# Patient Record
Sex: FEMALE | Race: WHITE | NOT HISPANIC OR LATINO | Employment: FULL TIME | URBAN - METROPOLITAN AREA
[De-identification: names, ages, dates, MRNs, and addresses within clinical notes are randomized per-mention and may not be internally consistent; named-entity substitution may affect disease eponyms.]

---

## 2023-05-23 ENCOUNTER — OFFICE VISIT (OUTPATIENT)
Dept: PRIMARY CARE | Facility: CLINIC | Age: 38
End: 2023-05-23
Payer: COMMERCIAL

## 2023-05-23 ENCOUNTER — LAB (OUTPATIENT)
Dept: LAB | Facility: LAB | Age: 38
End: 2023-05-23
Payer: COMMERCIAL

## 2023-05-23 VITALS
WEIGHT: 187 LBS | DIASTOLIC BLOOD PRESSURE: 61 MMHG | BODY MASS INDEX: 29.73 KG/M2 | RESPIRATION RATE: 16 BRPM | SYSTOLIC BLOOD PRESSURE: 103 MMHG | HEART RATE: 80 BPM | OXYGEN SATURATION: 97 %

## 2023-05-23 DIAGNOSIS — R19.7 ACUTE DIARRHEA: Primary | ICD-10-CM

## 2023-05-23 DIAGNOSIS — R19.7 ACUTE DIARRHEA: ICD-10-CM

## 2023-05-23 PROBLEM — F43.21 ADJUSTMENT DISORDER WITH DEPRESSED MOOD: Status: ACTIVE | Noted: 2023-05-23

## 2023-05-23 PROBLEM — D72.819 WBC DECREASED: Status: ACTIVE | Noted: 2023-05-23

## 2023-05-23 PROBLEM — N76.2 VULVITIS: Status: ACTIVE | Noted: 2023-05-23

## 2023-05-23 PROBLEM — L08.9: Status: RESOLVED | Noted: 2023-05-23 | Resolved: 2023-05-23

## 2023-05-23 PROBLEM — S00.81XS: Status: RESOLVED | Noted: 2023-05-23 | Resolved: 2023-05-23

## 2023-05-23 PROCEDURE — 87506 IADNA-DNA/RNA PROBE TQ 6-11: CPT

## 2023-05-23 PROCEDURE — 99213 OFFICE O/P EST LOW 20 MIN: CPT

## 2023-05-23 PROCEDURE — 87493 C DIFF AMPLIFIED PROBE: CPT

## 2023-05-23 ASSESSMENT — ENCOUNTER SYMPTOMS
DIZZINESS: 0
VOICE CHANGE: 0
RESPIRATORY NEGATIVE: 1
POLYDIPSIA: 0
ANAL BLEEDING: 0
CARDIOVASCULAR NEGATIVE: 1
RHINORRHEA: 0
NECK PAIN: 0
FATIGUE: 0
NERVOUS/ANXIOUS: 0
TREMORS: 0
MYALGIAS: 0
EYE DISCHARGE: 0
DIARRHEA: 1
NECK STIFFNESS: 0
UNEXPECTED WEIGHT CHANGE: 0
VOMITING: 0
ACTIVITY CHANGE: 0
PSYCHIATRIC NEGATIVE: 1
WEAKNESS: 0
DYSPHORIC MOOD: 0
SHORTNESS OF BREATH: 0
BRUISES/BLEEDS EASILY: 0
CONSTITUTIONAL NEGATIVE: 1
PALPITATIONS: 0
ABDOMINAL PAIN: 0
NUMBNESS: 0
TROUBLE SWALLOWING: 0
ENDOCRINE NEGATIVE: 1
HEMATURIA: 0
BACK PAIN: 0
SORE THROAT: 0
CONFUSION: 0
STRIDOR: 0
COLOR CHANGE: 0
MUSCULOSKELETAL NEGATIVE: 1
APPETITE CHANGE: 0
ALLERGIC/IMMUNOLOGIC NEGATIVE: 1
CONSTIPATION: 0
RECTAL PAIN: 0
PHOTOPHOBIA: 0
FLANK PAIN: 0
SINUS PRESSURE: 0
ABDOMINAL DISTENTION: 0
SEIZURES: 0
DIFFICULTY URINATING: 0
APNEA: 0
HEMATOLOGIC/LYMPHATIC NEGATIVE: 1
DYSURIA: 0
NAUSEA: 0
BLOOD IN STOOL: 0
HYPERACTIVE: 0
JOINT SWELLING: 0
COUGH: 0
WHEEZING: 0
HEADACHES: 1
POLYPHAGIA: 0
AGITATION: 0
EYES NEGATIVE: 1
LIGHT-HEADEDNESS: 0
CHEST TIGHTNESS: 0
SPEECH DIFFICULTY: 0
FREQUENCY: 0
SLEEP DISTURBANCE: 0

## 2023-05-23 NOTE — PROGRESS NOTES
Subjective   Elham Scott is a 38 y.o. female who presents for No chief complaint on file..    Started 4 days ago, eat out before she started getting diarrhea (meal- wild mushroom in a cream sauce; glass of wine; chocolate bomb)  Initially every 15mins she started having diarrhea, now having a few small BM a day  Tried eating rice and toast  Taking probiotic  Mild HA         Review of Systems   Constitutional: Negative.  Negative for activity change, appetite change, fatigue and unexpected weight change.   HENT: Negative.  Negative for congestion, dental problem, ear discharge, ear pain, hearing loss, mouth sores, nosebleeds, postnasal drip, rhinorrhea, sinus pressure, sneezing, sore throat, tinnitus, trouble swallowing and voice change.    Eyes: Negative.  Negative for photophobia, discharge and visual disturbance.   Respiratory: Negative.  Negative for apnea, cough, chest tightness, shortness of breath, wheezing and stridor.    Cardiovascular: Negative.  Negative for chest pain, palpitations and leg swelling.   Gastrointestinal:  Positive for diarrhea. Negative for abdominal distention, abdominal pain, anal bleeding, blood in stool, constipation, nausea, rectal pain and vomiting.   Endocrine: Negative.  Negative for cold intolerance, heat intolerance, polydipsia, polyphagia and polyuria.   Genitourinary: Negative.  Negative for decreased urine volume, difficulty urinating, dysuria, flank pain, frequency, hematuria and urgency.   Musculoskeletal: Negative.  Negative for back pain, gait problem, joint swelling, myalgias, neck pain and neck stiffness.   Skin: Negative.  Negative for color change and rash.   Allergic/Immunologic: Negative.  Negative for food allergies.   Neurological:  Positive for headaches (mild). Negative for dizziness, tremors, seizures, syncope, speech difficulty, weakness, light-headedness and numbness.   Hematological: Negative.  Does not bruise/bleed easily.   Psychiatric/Behavioral:  Negative.  Negative for agitation, confusion, dysphoric mood and sleep disturbance. The patient is not nervous/anxious and is not hyperactive.    All other systems reviewed and are negative.      Objective   There were no vitals taken for this visit.    Physical Exam  Vitals reviewed.   Constitutional:       General: She is not in acute distress.     Appearance: Normal appearance. She is normal weight. She is not ill-appearing, toxic-appearing or diaphoretic.   HENT:      Head: Normocephalic and atraumatic.      Nose: Nose normal.   Eyes:      Extraocular Movements: Extraocular movements intact.      Conjunctiva/sclera: Conjunctivae normal.      Pupils: Pupils are equal, round, and reactive to light.   Cardiovascular:      Rate and Rhythm: Normal rate and regular rhythm.      Pulses: Normal pulses.      Heart sounds: Normal heart sounds. No murmur heard.     No friction rub. No gallop.   Pulmonary:      Effort: Pulmonary effort is normal. No respiratory distress.      Breath sounds: Normal breath sounds.   Abdominal:      General: Abdomen is flat. Bowel sounds are normal. There is no distension.      Palpations: Abdomen is soft. There is no mass.      Tenderness: There is no abdominal tenderness. There is no right CVA tenderness, left CVA tenderness, guarding or rebound.      Hernia: No hernia is present.   Musculoskeletal:         General: Normal range of motion.      Cervical back: Normal range of motion and neck supple.   Skin:     General: Skin is warm and dry.      Capillary Refill: Capillary refill takes less than 2 seconds.   Neurological:      General: No focal deficit present.      Mental Status: She is alert and oriented to person, place, and time. Mental status is at baseline.   Psychiatric:         Mood and Affect: Mood normal.         Behavior: Behavior normal.         Thought Content: Thought content normal.         Judgment: Judgment normal.       Assessment/Plan   Problem List Items Addressed This  Visit          Digestive    Acute diarrhea - Primary    Relevant Orders    C. difficile, PCR    Stool Pathogen Panel, PCR  [place] diet discussed  Pepto Bismol OTC; she will call Friday if needs rx for diarrhea      Continue with RTC & follow up as needed

## 2023-05-24 LAB — C. DIFFICILE TOXIN, PCR: NOT DETECTED

## 2023-05-25 LAB
CAMPYLOBACTER GP: NOT DETECTED
NOROVIRUS GI/GII: NOT DETECTED
ROTAVIRUS A: DETECTED
SALMONELLA SP.: NOT DETECTED
SHIGA TOXIN 1: NOT DETECTED
SHIGA TOXIN 2: NOT DETECTED
SHIGELLA SP.: NOT DETECTED
VIBRIO GRP.: NOT DETECTED
YERSINIA ENTEROCOLITICA: NOT DETECTED

## 2023-05-26 NOTE — RESULT ENCOUNTER NOTE
Called and discussed positive rotavirus results with Elham.  She is doing much better, took Pepto-Bismol over-the-counter and started having formed stool again.  Discussed good hand hygiene and being contagious for up to 10 days from symptom starting.

## 2023-08-28 ENCOUNTER — OFFICE VISIT (OUTPATIENT)
Dept: PRIMARY CARE | Facility: CLINIC | Age: 38
End: 2023-08-28
Payer: COMMERCIAL

## 2023-08-28 VITALS
HEART RATE: 80 BPM | WEIGHT: 186.4 LBS | BODY MASS INDEX: 29.26 KG/M2 | OXYGEN SATURATION: 98 % | DIASTOLIC BLOOD PRESSURE: 62 MMHG | SYSTOLIC BLOOD PRESSURE: 100 MMHG | RESPIRATION RATE: 20 BRPM | HEIGHT: 67 IN

## 2023-08-28 DIAGNOSIS — M53.3 SI (SACROILIAC) JOINT DYSFUNCTION: Primary | ICD-10-CM

## 2023-08-28 DIAGNOSIS — K59.00 CONSTIPATION, UNSPECIFIED CONSTIPATION TYPE: ICD-10-CM

## 2023-08-28 DIAGNOSIS — M54.31 BILATERAL SCIATICA: ICD-10-CM

## 2023-08-28 DIAGNOSIS — M54.32 BILATERAL SCIATICA: ICD-10-CM

## 2023-08-28 PROBLEM — D62 ACUTE BLOOD LOSS ANEMIA: Status: ACTIVE | Noted: 2019-04-05

## 2023-08-28 PROCEDURE — 99214 OFFICE O/P EST MOD 30 MIN: CPT

## 2023-08-28 PROCEDURE — 1036F TOBACCO NON-USER: CPT

## 2023-08-28 RX ORDER — ASCORBIC ACID 500 MG
500 TABLET ORAL 2 TIMES DAILY
COMMUNITY
Start: 2019-04-05 | End: 2024-02-07 | Stop reason: WASHOUT

## 2023-08-28 RX ORDER — DOCUSATE SODIUM 50 MG/5ML
LIQUID ORAL
COMMUNITY

## 2023-08-28 RX ORDER — FERROUS SULFATE 325(65) MG
1 TABLET ORAL
COMMUNITY
Start: 2019-04-05 | End: 2024-02-07 | Stop reason: WASHOUT

## 2023-08-28 RX ORDER — IBUPROFEN 600 MG/1
600 TABLET ORAL EVERY 6 HOURS PRN
COMMUNITY
Start: 2019-04-05

## 2023-08-28 RX ORDER — DOCUSATE SODIUM 100 MG/1
2 CAPSULE, LIQUID FILLED ORAL NIGHTLY
COMMUNITY
Start: 2019-04-05 | End: 2023-12-05

## 2023-08-28 RX ORDER — AMOXICILLIN 500 MG/1
500 CAPSULE ORAL DAILY
COMMUNITY
Start: 2023-01-23 | End: 2023-12-05

## 2023-08-28 RX ORDER — CHOLECALCIFEROL (VITAMIN D3) 125 MCG
CAPSULE ORAL
COMMUNITY
End: 2024-02-07 | Stop reason: WASHOUT

## 2023-08-28 ASSESSMENT — ENCOUNTER SYMPTOMS
ACTIVITY CHANGE: 0
BLOOD IN STOOL: 0
SINUS PRESSURE: 0
ABDOMINAL PAIN: 0
UNEXPECTED WEIGHT CHANGE: 0
PSYCHIATRIC NEGATIVE: 1
POLYDIPSIA: 0
COUGH: 0
CONFUSION: 0
ENDOCRINE NEGATIVE: 1
STRIDOR: 0
HEADACHES: 0
APPETITE CHANGE: 0
VOMITING: 0
SLEEP DISTURBANCE: 0
POLYPHAGIA: 0
DIFFICULTY URINATING: 0
HYPERACTIVE: 0
TREMORS: 0
OCCASIONAL FEELINGS OF UNSTEADINESS: 0
HEMATURIA: 0
FATIGUE: 0
SHORTNESS OF BREATH: 0
NEUROLOGICAL NEGATIVE: 1
PHOTOPHOBIA: 0
DYSURIA: 0
DYSPHORIC MOOD: 0
WEAKNESS: 0
SEIZURES: 0
FEVER: 0
RESPIRATORY NEGATIVE: 1
BACK PAIN: 1
ARTHRALGIAS: 1
PALPITATIONS: 0
DIAPHORESIS: 0
LOSS OF SENSATION IN FEET: 0
TROUBLE SWALLOWING: 0
AGITATION: 0
WHEEZING: 0
CARDIOVASCULAR NEGATIVE: 1
VOICE CHANGE: 0
APNEA: 0
DEPRESSION: 0
FLANK PAIN: 0
BRUISES/BLEEDS EASILY: 0
NAUSEA: 0
CHILLS: 0
CONSTITUTIONAL NEGATIVE: 1
SPEECH DIFFICULTY: 0
DIARRHEA: 0
MYALGIAS: 1
EYES NEGATIVE: 1
RECTAL PAIN: 0
DIZZINESS: 0
NECK PAIN: 0
RHINORRHEA: 0
CHEST TIGHTNESS: 0
NERVOUS/ANXIOUS: 0
SORE THROAT: 0
FREQUENCY: 0
NUMBNESS: 0
HEMATOLOGIC/LYMPHATIC NEGATIVE: 1
NECK STIFFNESS: 0
COLOR CHANGE: 0
EYE DISCHARGE: 0
JOINT SWELLING: 0
LIGHT-HEADEDNESS: 0
ANAL BLEEDING: 0

## 2023-08-28 NOTE — PROGRESS NOTES
Primary Care Provider: Tha Cardoza MD    Subjective   Elham Scott is a 38 y.o. female who presents for Follow-up (Issues with hips).    BL SI joint pain with radiation down both legs; right worse than left  Comes and goes  Pain radiates down into buttock and quadriceps  Tight hamstrings  Pain worse around menstrual cycle  Got a new mattress   Has been going since June; has gotten slightly better but still ongoning  Worse at night  Has had regular menstrual cycles, LMP- today  Bloating  Hx of SI and piriformis   Taking Tylenol- only sometimes helps   Denies any numbness, tingling, or weakness         Review of Systems   Constitutional: Negative.  Negative for activity change, appetite change, chills, diaphoresis, fatigue, fever and unexpected weight change.   HENT: Negative.  Negative for congestion, dental problem, ear discharge, ear pain, hearing loss, mouth sores, nosebleeds, postnasal drip, rhinorrhea, sinus pressure, sneezing, sore throat, tinnitus, trouble swallowing and voice change.    Eyes: Negative.  Negative for photophobia, discharge and visual disturbance.   Respiratory: Negative.  Negative for apnea, cough, chest tightness, shortness of breath, wheezing and stridor.    Cardiovascular: Negative.  Negative for chest pain, palpitations and leg swelling.   Gastrointestinal:  Negative for abdominal pain, anal bleeding, blood in stool, diarrhea, nausea, rectal pain and vomiting.   Endocrine: Negative.  Negative for cold intolerance, heat intolerance, polydipsia, polyphagia and polyuria.   Genitourinary: Negative.  Negative for decreased urine volume, difficulty urinating, dysuria, flank pain, frequency, hematuria and urgency.   Musculoskeletal:  Positive for arthralgias, back pain and myalgias. Negative for gait problem, joint swelling, neck pain and neck stiffness.   Skin: Negative.  Negative for color change and rash.   Neurological: Negative.  Negative for dizziness, tremors, seizures, syncope,  "speech difficulty, weakness, light-headedness, numbness and headaches.   Hematological: Negative.  Does not bruise/bleed easily.   Psychiatric/Behavioral: Negative.  Negative for agitation, confusion, dysphoric mood, sleep disturbance and suicidal ideas. The patient is not nervous/anxious and is not hyperactive.    All other systems reviewed and are negative.        Objective   /62 Comment: blood pressure machine wouldnt read typically low  Pulse 80   Resp 20   Ht 1.689 m (5' 6.5\")   Wt 84.6 kg (186 lb 6.4 oz)   SpO2 98%   BMI 29.64 kg/m²     Physical Exam  Vitals and nursing note reviewed.   Constitutional:       Appearance: Normal appearance. She is normal weight.   HENT:      Head: Normocephalic and atraumatic.   Eyes:      Conjunctiva/sclera: Conjunctivae normal.   Cardiovascular:      Rate and Rhythm: Normal rate and regular rhythm.      Pulses: Normal pulses.      Heart sounds: Normal heart sounds.   Pulmonary:      Effort: Pulmonary effort is normal.      Breath sounds: Normal breath sounds.   Abdominal:      General: Abdomen is flat. Bowel sounds are normal. There is no distension.      Palpations: Abdomen is soft. There is no mass.      Tenderness: There is no abdominal tenderness. There is no right CVA tenderness, left CVA tenderness, guarding or rebound.      Hernia: No hernia is present.   Musculoskeletal:         General: No swelling, tenderness, deformity or signs of injury. Normal range of motion.      Cervical back: Normal range of motion and neck supple.      Right lower leg: No edema.      Left lower leg: No edema.   Skin:     General: Skin is warm and dry.      Capillary Refill: Capillary refill takes less than 2 seconds.   Neurological:      General: No focal deficit present.      Mental Status: She is alert and oriented to person, place, and time. Mental status is at baseline.   Psychiatric:         Mood and Affect: Mood normal.         Behavior: Behavior normal.         Thought " Content: Thought content normal.         Judgment: Judgment normal.         Assessment/Plan   Problem List Items Addressed This Visit       SI (sacroiliac) joint dysfunction - Primary    Relevant Orders    Referral to [facility]    Referral to [facility]  ICE  Sleep with pillow between legs   Ibuprofen PRN    Bilateral sciatica    Relevant Orders    Referral to [facility]    Referral to [facility]     Other Visit Diagnoses       Constipation, unspecified constipation type       Miralax PRN; if fails trial Benefiber         Follow up in 3-4 months or sooner as needed

## 2023-09-14 PROBLEM — Z13.71 TESTING OF FEMALE FOR GENETIC DISEASE CARRIER STATUS: Status: ACTIVE | Noted: 2023-09-14

## 2023-09-14 PROBLEM — E66.3 OVERWEIGHT WITH BODY MASS INDEX (BMI) OF 29 TO 29.9 IN ADULT: Status: ACTIVE | Noted: 2023-09-14

## 2023-10-12 NOTE — PROGRESS NOTES
"Assessment/Plan   Diagnoses and all orders for this visit:  Well woman exam (no gynecological exam)      Sakina Little, APRN-CNM     Subjective   Elham Scott is a 38 y.o. female who is here for a routine exam. Periods are {gyn period regularity:715}, lasting {numbers; 0-10:16671} days. Dysmenorrhea:{gyn dysmenorrhea:716}. Cyclic symptoms include {sys gyn cyclic sx:47905}. No intermenstrual bleeding, spotting, or discharge.    Current contraception: {contraceptive method:5051}  [unfilled]   History of abnormal Pap smear: yes - 2017 ascus/hpv - 2020 wnl/hpv-  Family history of uterine or ovarian cancer: {yes***/no:51200::\"no\"}  [unfilled]   Family history of breast cancer: {yes***/no:87357::\"no\"}  Regular self breast exam: {yes/no:63}  History of abnormal mammogram: {yes***/no:74899::\"no\"}    Menstrual History:  OB History    No obstetric history on file.        Menarche age: ***  No LMP recorded.         Review of Systems    Objective   There were no vitals taken for this visit.    General:   Alert and oriented x 3   Heart:  Thyroid: Regular rate, rhythm  Euthyroid, normal shape and size   Lungs:  Breast: Clear to auscultation bilaterally  Symmetrical, no skin changes/nipple discharge, redness, tenderness, no masses palpated bilaterally   Abdomen: Soft, non tender   Vulva: EGBUS normal   Vagina: Pink, normal discharge   Cervix: No CMT   Uterus: Normal shape, size   Adnexa: NT bilaterally     "

## 2023-10-17 ENCOUNTER — APPOINTMENT (OUTPATIENT)
Dept: OBSTETRICS AND GYNECOLOGY | Facility: CLINIC | Age: 38
End: 2023-10-17
Payer: COMMERCIAL

## 2023-11-08 NOTE — PROGRESS NOTES
Assessment/Plan   Diagnoses and all orders for this visit:  Well woman exam with routine gynecological exam      Sakina Little, APRN-CNM     Subjective   Elham Scott is a 38 y.o. female who is here for a routine exam. Periods are regular every 28-30 days, lasting 3 days. Dysmenorrhea:mild, occurring first 1-2 days of flow. Cyclic symptoms include none. No intermenstrual bleeding, spotting, or discharge.    Current contraception: vasectomy  [unfilled]   History of abnormal Pap smear: yes - 2017 Ascus  Family history of uterine or ovarian cancer: no  [unfilled]   Family history of breast cancer: yes - mgm   Regular self breast exam: yes  History of abnormal mammogram: no    Menstrual History:  OB History    No obstetric history on file.        Menarche age: 12  12/2/23      Review of Systems   All other systems reviewed and are negative.      Objective   There were no vitals taken for this visit.    General:   Alert and oriented x 3   Heart:  Thyroid: Regular rate, rhythm  Euthyroid, normal shape and size   Lungs:  Breast: Clear to auscultation bilaterally  Symmetrical, no skin changes/nipple discharge, redness, tenderness, no masses palpated bilaterally   Abdomen: Soft, non tender   Vulva: EGBUS normal   Vagina: Pink, normal discharge   Cervix: No CMT   Uterus: Normal shape, size   Adnexa: NT bilaterally

## 2023-12-05 ENCOUNTER — OFFICE VISIT (OUTPATIENT)
Dept: OBSTETRICS AND GYNECOLOGY | Facility: CLINIC | Age: 38
End: 2023-12-05
Payer: COMMERCIAL

## 2023-12-05 VITALS
DIASTOLIC BLOOD PRESSURE: 66 MMHG | BODY MASS INDEX: 30.45 KG/M2 | HEIGHT: 67 IN | WEIGHT: 194 LBS | SYSTOLIC BLOOD PRESSURE: 110 MMHG

## 2023-12-05 DIAGNOSIS — Z12.31 VISIT FOR SCREENING MAMMOGRAM: ICD-10-CM

## 2023-12-05 DIAGNOSIS — Z01.419 WELL WOMAN EXAM WITH ROUTINE GYNECOLOGICAL EXAM: Primary | ICD-10-CM

## 2023-12-05 PROCEDURE — 1036F TOBACCO NON-USER: CPT | Performed by: MIDWIFE

## 2023-12-05 PROCEDURE — 87624 HPV HI-RISK TYP POOLED RSLT: CPT

## 2023-12-05 PROCEDURE — 99396 PREV VISIT EST AGE 40-64: CPT | Performed by: MIDWIFE

## 2023-12-05 PROCEDURE — 88175 CYTOPATH C/V AUTO FLUID REDO: CPT

## 2023-12-05 ASSESSMENT — ENCOUNTER SYMPTOMS
CARDIOVASCULAR NEGATIVE: 0
HEMATOLOGIC/LYMPHATIC NEGATIVE: 0
NEUROLOGICAL NEGATIVE: 0
CONSTITUTIONAL NEGATIVE: 0
PSYCHIATRIC NEGATIVE: 0
ALLERGIC/IMMUNOLOGIC NEGATIVE: 0
RESPIRATORY NEGATIVE: 0
ENDOCRINE NEGATIVE: 0
EYES NEGATIVE: 0
MUSCULOSKELETAL NEGATIVE: 0
GASTROINTESTINAL NEGATIVE: 0

## 2023-12-05 ASSESSMENT — PAIN SCALES - GENERAL: PAINLEVEL: 0-NO PAIN

## 2023-12-20 ENCOUNTER — TELEPHONE (OUTPATIENT)
Dept: PRIMARY CARE | Facility: CLINIC | Age: 38
End: 2023-12-20
Payer: COMMERCIAL

## 2023-12-20 LAB
CYTOLOGY CMNT CVX/VAG CYTO-IMP: NORMAL
HPV HR 12 DNA GENITAL QL NAA+PROBE: NEGATIVE
HPV HR GENOTYPES PNL CVX NAA+PROBE: NEGATIVE
HPV16 DNA SPEC QL NAA+PROBE: NEGATIVE
HPV18 DNA SPEC QL NAA+PROBE: NEGATIVE
LAB AP HPV GENOTYPE QUESTION: YES
LAB AP HPV HR: NORMAL
LABORATORY COMMENT REPORT: NORMAL
PATH REPORT.TOTAL CANCER: NORMAL

## 2023-12-27 ENCOUNTER — LAB (OUTPATIENT)
Dept: LAB | Facility: LAB | Age: 38
End: 2023-12-27
Payer: COMMERCIAL

## 2023-12-27 DIAGNOSIS — Z00.00 ROUTINE GENERAL MEDICAL EXAMINATION AT A HEALTH CARE FACILITY: ICD-10-CM

## 2023-12-27 LAB
ALBUMIN SERPL BCP-MCNC: 3.9 G/DL (ref 3.4–5)
ALP SERPL-CCNC: 38 U/L (ref 33–110)
ALT SERPL W P-5'-P-CCNC: 10 U/L (ref 7–45)
ANION GAP SERPL CALC-SCNC: 11 MMOL/L (ref 10–20)
APPEARANCE UR: CLEAR
AST SERPL W P-5'-P-CCNC: 13 U/L (ref 9–39)
BACTERIA #/AREA URNS AUTO: ABNORMAL /HPF
BASOPHILS # BLD AUTO: 0.04 X10*3/UL (ref 0–0.1)
BASOPHILS NFR BLD AUTO: 1 %
BILIRUB SERPL-MCNC: 0.5 MG/DL (ref 0–1.2)
BILIRUB UR STRIP.AUTO-MCNC: NEGATIVE MG/DL
BUN SERPL-MCNC: 12 MG/DL (ref 6–23)
CALCIUM SERPL-MCNC: 9.1 MG/DL (ref 8.6–10.6)
CHLORIDE SERPL-SCNC: 107 MMOL/L (ref 98–107)
CHOLEST SERPL-MCNC: 176 MG/DL (ref 0–199)
CHOLESTEROL/HDL RATIO: 3.2
CO2 SERPL-SCNC: 28 MMOL/L (ref 21–32)
COLOR UR: YELLOW
CREAT SERPL-MCNC: 0.94 MG/DL (ref 0.5–1.05)
EOSINOPHIL # BLD AUTO: 0.08 X10*3/UL (ref 0–0.7)
EOSINOPHIL NFR BLD AUTO: 1.9 %
ERYTHROCYTE [DISTWIDTH] IN BLOOD BY AUTOMATED COUNT: 12.7 % (ref 11.5–14.5)
GFR SERPL CREATININE-BSD FRML MDRD: 79 ML/MIN/1.73M*2
GLUCOSE SERPL-MCNC: 91 MG/DL (ref 74–99)
GLUCOSE UR STRIP.AUTO-MCNC: NEGATIVE MG/DL
HCT VFR BLD AUTO: 37.8 % (ref 36–46)
HDLC SERPL-MCNC: 55.2 MG/DL
HGB BLD-MCNC: 12.4 G/DL (ref 12–16)
IMM GRANULOCYTES # BLD AUTO: 0.01 X10*3/UL (ref 0–0.7)
IMM GRANULOCYTES NFR BLD AUTO: 0.2 % (ref 0–0.9)
KETONES UR STRIP.AUTO-MCNC: NEGATIVE MG/DL
LDLC SERPL CALC-MCNC: 104 MG/DL
LEUKOCYTE ESTERASE UR QL STRIP.AUTO: ABNORMAL
LYMPHOCYTES # BLD AUTO: 1.3 X10*3/UL (ref 1.2–4.8)
LYMPHOCYTES NFR BLD AUTO: 31.1 %
MCH RBC QN AUTO: 30.4 PG (ref 26–34)
MCHC RBC AUTO-ENTMCNC: 32.8 G/DL (ref 32–36)
MCV RBC AUTO: 93 FL (ref 80–100)
MONOCYTES # BLD AUTO: 0.32 X10*3/UL (ref 0.1–1)
MONOCYTES NFR BLD AUTO: 7.7 %
NEUTROPHILS # BLD AUTO: 2.43 X10*3/UL (ref 1.2–7.7)
NEUTROPHILS NFR BLD AUTO: 58.1 %
NITRITE UR QL STRIP.AUTO: NEGATIVE
NON HDL CHOLESTEROL: 121 MG/DL (ref 0–149)
NRBC BLD-RTO: 0 /100 WBCS (ref 0–0)
PH UR STRIP.AUTO: 7 [PH]
PLATELET # BLD AUTO: 218 X10*3/UL (ref 150–450)
POTASSIUM SERPL-SCNC: 4.3 MMOL/L (ref 3.5–5.3)
PROT SERPL-MCNC: 6.2 G/DL (ref 6.4–8.2)
PROT UR STRIP.AUTO-MCNC: NEGATIVE MG/DL
RBC # BLD AUTO: 4.08 X10*6/UL (ref 4–5.2)
RBC # UR STRIP.AUTO: NEGATIVE /UL
RBC #/AREA URNS AUTO: ABNORMAL /HPF
SODIUM SERPL-SCNC: 142 MMOL/L (ref 136–145)
SP GR UR STRIP.AUTO: 1.01
SQUAMOUS #/AREA URNS AUTO: ABNORMAL /HPF
TRIGL SERPL-MCNC: 83 MG/DL (ref 0–149)
TSH SERPL-ACNC: 2.92 MIU/L (ref 0.44–3.98)
UROBILINOGEN UR STRIP.AUTO-MCNC: <2 MG/DL
VLDL: 17 MG/DL (ref 0–40)
WBC # BLD AUTO: 4.2 X10*3/UL (ref 4.4–11.3)
WBC #/AREA URNS AUTO: ABNORMAL /HPF

## 2023-12-27 PROCEDURE — 84443 ASSAY THYROID STIM HORMONE: CPT

## 2023-12-27 PROCEDURE — 80061 LIPID PANEL: CPT

## 2023-12-27 PROCEDURE — 85025 COMPLETE CBC W/AUTO DIFF WBC: CPT

## 2023-12-27 PROCEDURE — 36415 COLL VENOUS BLD VENIPUNCTURE: CPT

## 2023-12-27 PROCEDURE — 81001 URINALYSIS AUTO W/SCOPE: CPT

## 2023-12-27 PROCEDURE — 80053 COMPREHEN METABOLIC PANEL: CPT

## 2023-12-27 ASSESSMENT — PROMIS GLOBAL HEALTH SCALE
EMOTIONAL_PROBLEMS: SOMETIMES
RATE_SOCIAL_SATISFACTION: GOOD
CARRYOUT_PHYSICAL_ACTIVITIES: COMPLETELY
RATE_QUALITY_OF_LIFE: VERY GOOD
CARRYOUT_SOCIAL_ACTIVITIES: VERY GOOD
RATE_GENERAL_HEALTH: GOOD
RATE_AVERAGE_FATIGUE: MODERATE
RATE_MENTAL_HEALTH: GOOD
RATE_AVERAGE_PAIN: 2
RATE_PHYSICAL_HEALTH: GOOD

## 2023-12-28 ENCOUNTER — APPOINTMENT (OUTPATIENT)
Dept: PRIMARY CARE | Facility: CLINIC | Age: 38
End: 2023-12-28
Payer: COMMERCIAL

## 2023-12-29 ENCOUNTER — OFFICE VISIT (OUTPATIENT)
Dept: PRIMARY CARE | Facility: CLINIC | Age: 38
End: 2023-12-29
Payer: COMMERCIAL

## 2023-12-29 VITALS
HEART RATE: 81 BPM | RESPIRATION RATE: 20 BRPM | OXYGEN SATURATION: 99 % | DIASTOLIC BLOOD PRESSURE: 78 MMHG | HEIGHT: 67 IN | BODY MASS INDEX: 30.13 KG/M2 | SYSTOLIC BLOOD PRESSURE: 120 MMHG | WEIGHT: 192 LBS

## 2023-12-29 DIAGNOSIS — Z23 ENCOUNTER FOR IMMUNIZATION: ICD-10-CM

## 2023-12-29 DIAGNOSIS — Z00.00 ROUTINE GENERAL MEDICAL EXAMINATION AT A HEALTH CARE FACILITY: Primary | ICD-10-CM

## 2023-12-29 PROCEDURE — 99396 PREV VISIT EST AGE 40-64: CPT | Performed by: INTERNAL MEDICINE

## 2023-12-29 PROCEDURE — 90686 IIV4 VACC NO PRSV 0.5 ML IM: CPT | Performed by: INTERNAL MEDICINE

## 2023-12-29 PROCEDURE — 1036F TOBACCO NON-USER: CPT | Performed by: INTERNAL MEDICINE

## 2023-12-29 PROCEDURE — 90471 IMMUNIZATION ADMIN: CPT | Performed by: INTERNAL MEDICINE

## 2023-12-29 ASSESSMENT — PATIENT HEALTH QUESTIONNAIRE - PHQ9
1. LITTLE INTEREST OR PLEASURE IN DOING THINGS: NOT AT ALL
SUM OF ALL RESPONSES TO PHQ9 QUESTIONS 1 AND 2: 0
2. FEELING DOWN, DEPRESSED OR HOPELESS: NOT AT ALL

## 2023-12-29 ASSESSMENT — ENCOUNTER SYMPTOMS
LOSS OF SENSATION IN FEET: 0
DEPRESSION: 0
OCCASIONAL FEELINGS OF UNSTEADINESS: 0

## 2023-12-29 NOTE — PROGRESS NOTES
"Subjective   Patient ID: Elham Scott is a 38 y.o. female who presents for Annual Exam (Taking /Vit d /Fish oil /).    CPE    38 F    R Gluteal/piriformis discomfort- improving with Home exercises     ? ADD      HPI     Review of Systems      No Fever/chills/headaches/dizziness/chest pains/ cough/ shortness of breath/palpitations/ abdominal pain /Nausea/vomiting/diarrhea/ constipation/urine frequency/blood in urine.      Objective   Pulse 81   Resp 20   Ht 1.702 m (5' 7\")   Wt 87.1 kg (192 lb)   LMP 12/02/2023   SpO2 99%   BMI 30.07 kg/m²     Physical Exam      No JVP elevation. No palpable Lymph Nodes. No Thyromegaly    HEENT- Negative    CVS-NL S1/S2 . No MRG    Lungs-CTA. B/S= B/L    Abdomen-Soft, Non-tender. No masses or HSM    Extremities: No C/C/E    Skin-No abnormal moles/rash        Assessment/Plan        CPE    38 F    R Gluteal/piriformis discomfort- improving with Home exercises     ? ADD    Plan:    Refer to Psychiatry for review of Medication options including stimulants versus nonstimulants.    Labs    Continue with current [id number] if symptoms persist/worsen    Mammogram    Continue with current Rx    Flu Vax    Follow up in 12 months /PRN    "

## 2024-01-16 ENCOUNTER — ANCILLARY PROCEDURE (OUTPATIENT)
Dept: RADIOLOGY | Facility: CLINIC | Age: 39
End: 2024-01-16
Payer: COMMERCIAL

## 2024-01-16 VITALS — BODY MASS INDEX: 30.14 KG/M2 | HEIGHT: 67 IN | WEIGHT: 192.02 LBS

## 2024-01-16 DIAGNOSIS — Z12.31 VISIT FOR SCREENING MAMMOGRAM: ICD-10-CM

## 2024-01-16 PROCEDURE — 77063 BREAST TOMOSYNTHESIS BI: CPT | Performed by: RADIOLOGY

## 2024-01-16 PROCEDURE — 77067 SCR MAMMO BI INCL CAD: CPT | Performed by: RADIOLOGY

## 2024-01-16 PROCEDURE — 77063 BREAST TOMOSYNTHESIS BI: CPT

## 2024-02-07 ENCOUNTER — OFFICE VISIT (OUTPATIENT)
Dept: OBSTETRICS AND GYNECOLOGY | Facility: CLINIC | Age: 39
End: 2024-02-07
Payer: COMMERCIAL

## 2024-02-07 VITALS
SYSTOLIC BLOOD PRESSURE: 120 MMHG | DIASTOLIC BLOOD PRESSURE: 70 MMHG | HEIGHT: 67 IN | BODY MASS INDEX: 29.88 KG/M2 | WEIGHT: 190.4 LBS

## 2024-02-07 DIAGNOSIS — N95.1 MENOPAUSAL SYNDROME ON HORMONE REPLACEMENT THERAPY: Primary | ICD-10-CM

## 2024-02-07 DIAGNOSIS — M25.559 HIP PAIN, UNSPECIFIED LATERALITY: ICD-10-CM

## 2024-02-07 DIAGNOSIS — N95.1 PERIMENOPAUSAL: ICD-10-CM

## 2024-02-07 DIAGNOSIS — Z79.890 MENOPAUSAL SYNDROME ON HORMONE REPLACEMENT THERAPY: Primary | ICD-10-CM

## 2024-02-07 PROCEDURE — 1036F TOBACCO NON-USER: CPT | Performed by: NURSE PRACTITIONER

## 2024-02-07 PROCEDURE — 99214 OFFICE O/P EST MOD 30 MIN: CPT | Performed by: NURSE PRACTITIONER

## 2024-02-07 RX ORDER — PROGESTERONE 200 MG/1
CAPSULE ORAL
Qty: 14 CAPSULE | Refills: 11 | Status: SHIPPED | OUTPATIENT
Start: 2024-02-07

## 2024-02-07 RX ORDER — ESTRADIOL 0.5 MG/1
0.5 TABLET ORAL DAILY
Qty: 30 TABLET | Refills: 11 | Status: SHIPPED | OUTPATIENT
Start: 2024-02-07 | End: 2024-04-08 | Stop reason: ALTCHOICE

## 2024-02-07 RX ORDER — OMEGA-3-ACID ETHYL ESTERS 1 G/1
1 CAPSULE, LIQUID FILLED ORAL 2 TIMES DAILY
COMMUNITY

## 2024-02-07 ASSESSMENT — ENCOUNTER SYMPTOMS
NEUROLOGICAL NEGATIVE: 0
CARDIOVASCULAR NEGATIVE: 0
MUSCULOSKELETAL NEGATIVE: 0
GASTROINTESTINAL NEGATIVE: 0
ENDOCRINE NEGATIVE: 0
EYES NEGATIVE: 0
ALLERGIC/IMMUNOLOGIC NEGATIVE: 0
CONSTITUTIONAL NEGATIVE: 0
PSYCHIATRIC NEGATIVE: 0
HEMATOLOGIC/LYMPHATIC NEGATIVE: 0
RESPIRATORY NEGATIVE: 0

## 2024-02-07 ASSESSMENT — PAIN SCALES - GENERAL: PAINLEVEL: 0-NO PAIN

## 2024-02-07 NOTE — PROGRESS NOTES
Subjective   Patient ID: Elham Scott is a 39 y.o. female who presents for Menopause (Pt here to discuss perimenopause. Symptom difficulty sleeping, low sex  drive, increased menstrual symptoms.).  HPI  Concerns:   H/o depression, diagnosed with ADHD, worked with a therapist and plans to start medication  H/o hip and joint pain started 6/2023    Menopausal age: perimenopausal   Regular and monthly periods; worsening HA, dysmenorrhea and heavier flow    Any Contraindications to HT: personal h/o breast cancer, estrogen sensitive cancer, dementia, stroke, MI, VTE or inherited high risk for VTE, SCAD: none  h/o congenital heart disease (increased risk of DVT) none    contraception:  none, vasectomy  Previous h/o weight gain with OCP  HT: none  use of OTC remedies: vitamins  bioidenticals: none      VMS: none  Sleep difficulties: yes, interrupted sleep  Mood changes: yes  Joint pain: yes  Brain fog/difficulty concentrating: yes    GSM: yes  are you sexually active: yes  dyspareunia: none  decrease in desire: yes  difficulty reaching orgasm:  no  Urinary Incontinence: none         Fractures: none        Review of Systems    Objective   Physical Exam    Assessment/Plan   Diagnoses and all orders for this visit:  Menopausal syndrome on hormone replacement therapy  -     estradiol (Estrace) 0.5 mg tablet; Take 1 tablet (0.5 mg) by mouth once daily.  -     progesterone (Prometrium) 200 mg capsule; Take one pill by mouth at bedtime for 14 days/month  Perimenopausal    Follow up in 8 weeks  Will consider increasing estradiol  Addyi and Ristela discussed  A list of vaginal lubricants and moisturizers that are safe to use was given to patient        PAM Jaimes 02/07/24 9:35 AM

## 2024-02-21 ENCOUNTER — EVALUATION (OUTPATIENT)
Dept: PHYSICAL THERAPY | Facility: CLINIC | Age: 39
End: 2024-02-21
Payer: COMMERCIAL

## 2024-02-21 DIAGNOSIS — M25.551 BILATERAL HIP PAIN: ICD-10-CM

## 2024-02-21 DIAGNOSIS — M25.552 BILATERAL HIP PAIN: ICD-10-CM

## 2024-02-21 DIAGNOSIS — M53.3 SI (SACROILIAC) JOINT DYSFUNCTION: Primary | ICD-10-CM

## 2024-02-21 DIAGNOSIS — M25.559 HIP PAIN, UNSPECIFIED LATERALITY: ICD-10-CM

## 2024-02-21 PROCEDURE — 97161 PT EVAL LOW COMPLEX 20 MIN: CPT | Mod: GP

## 2024-02-21 PROCEDURE — 97110 THERAPEUTIC EXERCISES: CPT | Mod: GP

## 2024-02-21 NOTE — PROGRESS NOTES
Physical Therapy  Physical Therapy Orthopedic Evaluation    Patient Name: Elham Scott  MRN: [id number]  Today's Date: 2/21/2024                  Insurance:  Visit number: 1 of 20  Authorization info: No auth   Insurance Type: Payor: [facility] / Plan: [facility] / Product Type: *No Product type* /      Current Problem  1. SI (sacroiliac) joint dysfunction        2. Hip pain, unspecified laterality  Referral to Physical Therapy      3. Bilateral hip pain            General:  General  Reason for Referral: BL hip pain  Referred By: Rock Flores CNP    Precautions:  Precautions  Precautions Comment: Asthma  Medical History Form: Reviewed (scanned into chart)    Subjective:   PEGGY: Pt reports to evaluation due to SIJ and hip pain with some sciatica down the R leg. Pt has had this pain since June of 2023. Pt is unsure if this was due to one specific mechanism or from a variety of changes including a mattress change. 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 Pt has two children with the youngest being 5. Pt will feel her sciatic pain most days of the week and it will go down the back of the leg. Pt denies any bowel or bladder dysfunction due to her low back pain.     Previous Med Management: Aquatics, myofascial release     PMH: Pregnancy     Aggravating Factors: Being still (waking up and going to bed)     Relieving Factors: Movement     Pain/Symptom Scale:  Current: 0/10  Worst: 5/10  Best: 0/10  Location/Nature: BL gluteal region and wraps around to the front of the hips. Pt describes it as achy and tight. Pain usually comes in quick bursts  Meds: Ibuprofen at night sometimes     PLOF: Pt was able to perform all activities without hip or SIJ pain prior to 2023.   ADL: Pt will need to make minor adjustments with ADLs  Work: Aquatic Therapist   Athletics: Walk on the treadmill  Recreation/ Hobbies: Being with her kids and piano     Goals: Pt would  like to decrease her pain and come up with a good strengthening program    Language: English      Red Flags: Do you have any of the following? No  Fever/chills, unexplained weight changes, dizziness/fainting, unexplained change in bowel or bladder functions, unexplained malaise or muscle weakness, night pain/sweats, numbness or tingling    Objective:  Palpation/Observation   Increased paraspinal tightness and slightly hyper lordotic posture noted upon palpation    Repeated lumbar flexion: No increase in symptoms  Repeated lumbar extension: No increase in symptoms     Long axis distraction: Pt felt relief of SIJ symptoms when long axis leg pull was performed BL    LE MMT  Hip abduction- R: 4+/5 L: 4+/5  Hip adduction- R: 4+/5 L: 4+/5  Hip extension - R: 4/5  L: 4/5    Hip ROM   Pain free and WFL BL    Flexibility  Hamstrings: WFL BL   Hip flexors: More increased tightness noted on R side compared to L when Dilip test was performed       Outcome Measures:  Other Measures  Lower Extremity Funtional Score (LEFS): 66/80     Treatments:  Access Code: [email or web address]  URL: [email or web address]  Date: 02/21/2024  Prepared by: Brendan Schuler    Exercises  Sidelying piriformis strengthening   - Supine Bridge  - 1 x daily - 7 x weekly - 3 sets - 10 reps  - Half Kneeling Hip Flexor Stretch  - 1 x daily - 7 x weekly - 3 sets - 10 reps  - Dilip Stretch on Table  - 1 x daily - 7 x weekly - 3 sets - 10 reps  - Supine Posterior Pelvic Tilt  - 1 x daily - 7 x weekly - 3 sets - 10 reps  - Hooklying Clamshell with Resistance  - 1 x daily - 7 x weekly - 3 sets - 10 reps    EDUCATION: Home exercise program, plan of care, activity modifications, pain management, and injury pathology       Assessment:     Patient is a 39 year old female that presents to physical therapy evaluation today due to complaints of BL hip and low back pain with R sided sciatica. Patient impairments include flexibility deficits of hip flexors,  strength deficits in core and piriformis musculature, and motor control deficits including lack of pelvic control. Due to these impairments, the patients has the following functional limitations: pain with waking up, decreased standing tolerance, and an overall decrease in functional mobility. Skilled therapy recommended in order to to address their impairments and progress towards the associated functional goals. Prognosis is good secondary to their current presentation and client understanding. The pt demonstrates a good understanding of their current plan of care, rehab expectations, and prognosis.       Plan:     Planned Interventions include: therapeutic exercise, self-care home management, manual therapy, therapeutic activities, gait training, neuromuscular coordination, vasopneumatic, dry needling, aquatic therapy  Frequency: 1 x Week  Duration: 8 Weeks  Goals: Set and discussed today  Active       PT Problem       PT Goals       Start:  02/21/24       1. Pt samy increase all hip MMT to 5/5  in order to demo an increase in muscular strength   2. Pt will be able to perform repeated lumbar/thoracic flexion, extension, and side bending with no increase in discomfort in order to demo an increase in functional mobility   3. Pt will be able to stand for 1 hour with no increase in pain or discomfort in order to demo an increase in functional ability  4.  Patient will be able to wake up without any increase SIJ or hip pain.  5.patient will be able to perform full fitness routine without any increase in discomfort.                 Plan of care was developed with input and agreement by the patient      Brendan Schuler PT

## 2024-02-29 DIAGNOSIS — N95.1 MENOPAUSAL SYNDROME ON HORMONE REPLACEMENT THERAPY: ICD-10-CM

## 2024-02-29 DIAGNOSIS — Z79.890 MENOPAUSAL SYNDROME ON HORMONE REPLACEMENT THERAPY: ICD-10-CM

## 2024-03-19 ENCOUNTER — APPOINTMENT (OUTPATIENT)
Dept: PHYSICAL THERAPY | Facility: CLINIC | Age: 39
End: 2024-03-19
Payer: COMMERCIAL

## 2024-03-29 ENCOUNTER — APPOINTMENT (OUTPATIENT)
Dept: OBSTETRICS AND GYNECOLOGY | Facility: CLINIC | Age: 39
End: 2024-03-29
Payer: COMMERCIAL

## 2024-04-08 DIAGNOSIS — Z79.890 MENOPAUSAL SYNDROME ON HORMONE REPLACEMENT THERAPY: Primary | ICD-10-CM

## 2024-04-08 DIAGNOSIS — N95.1 MENOPAUSAL SYNDROME ON HORMONE REPLACEMENT THERAPY: Primary | ICD-10-CM

## 2024-04-08 RX ORDER — ESTRADIOL 1 MG/1
1 TABLET ORAL DAILY
Qty: 30 TABLET | Refills: 11 | Status: SHIPPED | OUTPATIENT
Start: 2024-04-08 | End: 2024-05-24

## 2024-05-22 DIAGNOSIS — Z79.890 MENOPAUSAL SYNDROME ON HORMONE REPLACEMENT THERAPY: ICD-10-CM

## 2024-05-22 DIAGNOSIS — N95.1 MENOPAUSAL SYNDROME ON HORMONE REPLACEMENT THERAPY: ICD-10-CM

## 2024-05-24 DIAGNOSIS — Z79.890 MENOPAUSAL SYNDROME ON HORMONE REPLACEMENT THERAPY: ICD-10-CM

## 2024-05-24 DIAGNOSIS — N95.1 MENOPAUSAL SYNDROME ON HORMONE REPLACEMENT THERAPY: ICD-10-CM

## 2024-05-24 RX ORDER — ESTRADIOL 0.5 MG/1
0.5 TABLET ORAL DAILY
Qty: 90 TABLET | Refills: 4 | OUTPATIENT
Start: 2024-05-24

## 2024-05-24 RX ORDER — ESTRADIOL 1 MG/1
1 TABLET ORAL DAILY
Qty: 90 TABLET | Refills: 3 | Status: SHIPPED | OUTPATIENT
Start: 2024-05-24

## 2024-05-24 RX ORDER — ESTRADIOL 1 MG/1
1 TABLET ORAL DAILY
Qty: 90 TABLET | Refills: 3 | Status: SHIPPED | OUTPATIENT
Start: 2024-05-24 | End: 2025-05-24

## 2024-07-08 PROBLEM — Z86.19 HISTORY OF VARICELLA: Status: ACTIVE | Noted: 2024-07-08

## 2024-07-08 PROBLEM — K59.00 CONSTIPATION: Status: ACTIVE | Noted: 2024-07-08

## 2024-07-08 PROBLEM — S89.90XA KNEE INJURY: Status: ACTIVE | Noted: 2024-07-08

## 2024-07-08 PROBLEM — J45.990 EXERCISE-INDUCED ASTHMA (HHS-HCC): Status: ACTIVE | Noted: 2024-07-08

## 2024-07-08 PROBLEM — N95.1 MENOPAUSAL SYNDROME: Status: ACTIVE | Noted: 2024-07-08

## 2024-07-09 ENCOUNTER — APPOINTMENT (OUTPATIENT)
Dept: PRIMARY CARE | Facility: CLINIC | Age: 39
End: 2024-07-09
Payer: COMMERCIAL

## 2024-07-09 VITALS
HEART RATE: 84 BPM | WEIGHT: 200.4 LBS | DIASTOLIC BLOOD PRESSURE: 80 MMHG | RESPIRATION RATE: 18 BRPM | BODY MASS INDEX: 31.39 KG/M2 | SYSTOLIC BLOOD PRESSURE: 120 MMHG | OXYGEN SATURATION: 98 %

## 2024-07-09 DIAGNOSIS — J45.990 EXERCISE-INDUCED ASTHMA (HHS-HCC): ICD-10-CM

## 2024-07-09 DIAGNOSIS — R10.30 LOWER ABDOMINAL PAIN: Primary | ICD-10-CM

## 2024-07-09 DIAGNOSIS — R10.2 PELVIC PAIN: ICD-10-CM

## 2024-07-09 PROCEDURE — 1036F TOBACCO NON-USER: CPT | Performed by: INTERNAL MEDICINE

## 2024-07-09 PROCEDURE — 99214 OFFICE O/P EST MOD 30 MIN: CPT | Performed by: INTERNAL MEDICINE

## 2024-07-09 ASSESSMENT — ENCOUNTER SYMPTOMS
ANOREXIA: 0
HEMATOCHEZIA: 0
HEMATURIA: 0
CRAMPS: 1
MYALGIAS: 1
ARTHRALGIAS: 1
FEVER: 0
OCCASIONAL FEELINGS OF UNSTEADINESS: 0
LOSS OF SENSATION IN FEET: 0
HIP PAIN: 1
WEIGHT LOSS: 0
DEPRESSION: 0
VOMITING: 0
ABDOMINAL PAIN: 1
HEADACHES: 0
CONSTIPATION: 1
NAUSEA: 0
DIARRHEA: 0
FREQUENCY: 1
FLATUS: 1
DYSURIA: 0
BELCHING: 1
INSOMNIA: 1

## 2024-07-09 ASSESSMENT — PATIENT HEALTH QUESTIONNAIRE - PHQ9
2. FEELING DOWN, DEPRESSED OR HOPELESS: NOT AT ALL
1. LITTLE INTEREST OR PLEASURE IN DOING THINGS: NOT AT ALL
SUM OF ALL RESPONSES TO PHQ9 QUESTIONS 1 AND 2: 0

## 2024-07-09 NOTE — PROGRESS NOTES
Subjective   Patient ID: Elham Scott is a 39 y.o. female who presents for Abdominal Cramping, Hip Pain, and Insomnia.    Abdominal Cramping  Associated symptoms include arthralgias, belching, constipation, flatus, frequency and myalgias. Pertinent negatives include no anorexia, diarrhea, dysuria, fever, headaches, hematochezia, hematuria, melena, nausea, vomiting or weight loss.   Hip Pain     Insomnia  Associated symptoms include abdominal pain, arthralgias and myalgias. Pertinent negatives include no anorexia, fever, headaches, nausea or vomiting.   Abdominal Pain  This is a chronic problem. The current episode started more than 1 year ago. The onset quality is gradual. The problem occurs daily. The problem has been gradually worsening. The pain is located in the LLQ, RLQ, left flank and right flank. The pain is at a severity of 2/10. The quality of the pain is aching. The abdominal pain radiates to the periumbilical region, back, left flank, right flank and pelvis. Associated symptoms include arthralgias, belching, constipation, flatus, frequency and myalgias. Pertinent negatives include no anorexia, diarrhea, dysuria, fever, headaches, hematochezia, hematuria, melena, nausea, vomiting or weight loss. The pain is aggravated by being still. The pain is relieved by Activity, certain positions, liquids, movement, palpation and passing flatus.        Abdominal pain    Bloating    Lower back strain/Pelvis pain/ Achilles         Review of Systems   Constitutional:  Negative for fever and weight loss.   Gastrointestinal:  Positive for abdominal pain, constipation and flatus. Negative for anorexia, diarrhea, hematochezia, melena, nausea and vomiting.   Genitourinary:  Positive for frequency. Negative for dysuria and hematuria.   Musculoskeletal:  Positive for arthralgias and myalgias.   Neurological:  Negative for headaches.   Psychiatric/Behavioral:  The patient has insomnia.        Lower back strain    Hip girdle  pain    Abdominal bloating      No Fever/chills/headaches/dizziness/chest pains/ cough/ shortness of breath/palpitations/ /Nausea/vomiting/diarrhea/ constipation/urine frequency/blood in urine.          Objective   Pulse 84   Resp 18   Wt 90.9 kg (200 lb 6.4 oz)   LMP 06/24/2024 (Exact Date)   SpO2 98%   BMI 31.39 kg/m²     Physical Exam    No JVP elevation. No palpable Lymph Nodes. No Thyromegaly    HEENT- Negative    CVS-NL S1/S2 . No MRG    Lungs-CTA. B/S= B/L    Abdomen-Soft, Non-tender. No masses or HSM    Extremities: No C/C/E    Skin-No abnormal moles/rash        Assessment/Plan     Abdominal pain    Bloating    Lower back strain/Pelvis pain/ Achilles     ? ADD    Plan:    CT A/P    ? Colonoscopy    DC Consult    Psych re ? ADD    Continue with current Rx    Follow up in 12 months /PRN

## 2024-07-09 NOTE — PROGRESS NOTES
Answers submitted by the patient for this visit:  Abdominal Pain Questionnaire (Submitted on 7/9/2024)  Chief Complaint: Abdominal pain  Chronicity: chronic  Onset: more than 1 year ago  Onset quality: gradual  Frequency: daily  Progression since onset: gradually worsening  Pain location: LLQ, RLQ, left flank, right flank  Pain - numeric: 2/10  Pain quality: aching  Radiates to: periumbilical region, back, left flank, right flank, pelvis  anorexia: No  arthralgias: Yes  belching: Yes  constipation: Yes  diarrhea: No  dysuria: No  fever: No  flatus: Yes  frequency: Yes  headaches: No  hematochezia: No  hematuria: No  melena: No  myalgias: Yes  nausea: No  weight loss: No  vomiting: No  Aggravated by: being still  Relieved by: activity, certain positions, liquids, movement, palpation, passing flatus

## 2024-07-24 ENCOUNTER — APPOINTMENT (OUTPATIENT)
Dept: RADIOLOGY | Facility: HOSPITAL | Age: 39
End: 2024-07-24
Payer: COMMERCIAL

## 2024-07-26 ENCOUNTER — HOSPITAL ENCOUNTER (OUTPATIENT)
Dept: RADIOLOGY | Facility: CLINIC | Age: 39
Discharge: HOME | End: 2024-07-26
Payer: COMMERCIAL

## 2024-07-26 DIAGNOSIS — R10.30 LOWER ABDOMINAL PAIN: ICD-10-CM

## 2024-07-26 PROCEDURE — 74176 CT ABD & PELVIS W/O CONTRAST: CPT

## 2024-08-02 DIAGNOSIS — K42.9 UMBILICAL HERNIA WITHOUT OBSTRUCTION AND WITHOUT GANGRENE: Primary | ICD-10-CM

## 2024-08-06 ENCOUNTER — PATIENT MESSAGE (OUTPATIENT)
Dept: PRIMARY CARE | Facility: CLINIC | Age: 39
End: 2024-08-06
Payer: COMMERCIAL

## 2024-08-06 DIAGNOSIS — F90.9 ATTENTION DEFICIT HYPERACTIVITY DISORDER (ADHD), UNSPECIFIED ADHD TYPE: ICD-10-CM

## 2024-08-08 ENCOUNTER — TELEPHONE (OUTPATIENT)
Dept: SURGERY | Facility: CLINIC | Age: 39
End: 2024-08-08
Payer: COMMERCIAL

## 2024-08-20 ENCOUNTER — APPOINTMENT (OUTPATIENT)
Dept: BEHAVIORAL HEALTH | Facility: CLINIC | Age: 39
End: 2024-08-20
Payer: COMMERCIAL

## 2024-08-20 DIAGNOSIS — F90.9 ATTENTION DEFICIT HYPERACTIVITY DISORDER (ADHD), UNSPECIFIED ADHD TYPE: ICD-10-CM

## 2024-08-20 PROCEDURE — 99204 OFFICE O/P NEW MOD 45 MIN: CPT | Performed by: PSYCHIATRY & NEUROLOGY

## 2024-08-20 RX ORDER — BUPROPION HYDROCHLORIDE 150 MG/1
150 TABLET ORAL EVERY MORNING
Qty: 30 TABLET | Refills: 1 | Status: SHIPPED | OUTPATIENT
Start: 2024-08-20 | End: 2024-10-19

## 2024-08-20 NOTE — PROGRESS NOTES
Outpatient Psychiatry    Subjective   Elham Scott, a 39 y.o. female, presenting to Psychiatry for evaluation.  Patient is referred by Tha Cardoza MD .         Chief Complaint: ADHD medication     HPI:    Patient is hoping for ADHD medication   School starts Monday for two kids  Had familyl reunion - was also a celebration of life for her father   2022 had depression and anxiety - was unable to do stuff with kids   went to therapy   Had a low  mood, cranky, irritable, not motivated  Was  able to hide it from others  Dec of 2022 - Switch just flipped    HS it began    Has had general  anxiety for a long time- can recognize it  8/2023 -  Dad passed away -     8 year old son ASD diagnosed through school - [employer]    5 year old ADHD    Perimenopause -  thinks this is making her symptoms worse  Chronic insomnia   Can fall asleep but wakes up frequently  On HRT since end of Jan 2024   Kids have never slept well but have leveled off  Quality of sleep has been less and less  Also has been having hip pain - undertain why -   Irritabilty, sleepliness, low sex drive, weight gain,   heavy periods have resolved  Hormone Harmoney supplment - B12 for two  months which has improved sleep  Just had CT scan - has been having a lot of digestive problems  Has abdominal hernia   Bought a weighted vest which has helped  Knows she has a postural imbalance    May try chiropractor     Feels overwhelmed   Brain is on overdrive  Cannot do things in a linear fashion  Taylored life around ADHD  Mom wrote all of her papers for her in HS  Structure works for her  Searching for dopamine through food  Cannot set her own structure    Patient denies SI, HI, manic or psychotic symptoms.      Psychiatric Review Of Systems:  Depressive Symptoms: anhedonia, concentration, and sleep decreased   Manic Symptoms: negative  Anxiety Symptoms: General Anxiety Disorder (JAYCOB)JAYCOB Behaviors: difficult to control worry, difficulty concentrating,  easily fatigued, irritability, restlessness, and sleep disturbance  Psychotic Symptoms: negative  Other Symptoms:  ADHD - inattentive symptoms present     Current Medications:    Current Outpatient Medications:     docusate sodium (Colace) 50 mg/5 mL oral liquid, Take by mouth., Disp: , Rfl:     ibuprofen 600 mg tablet, Take 1 tablet (600 mg) by mouth every 6 hours if needed., Disp: , Rfl:     omega-3 acid ethyl esters (Lovaza) 1 gram capsule, Take 1 capsule (1 g) by mouth 2 times a day., Disp: , Rfl:     progesterone (Prometrium) 200 mg capsule, Take one pill by mouth at bedtime for 14 days/month, Disp: 14 capsule, Rfl: 11    Medical History:  Past Medical History:   Diagnosis Date    Allergy to other foods 10/17/2014    History of food allergy    Chronic rhinitis 10/17/2014    Rhinitis    Encounter for gynecological examination (general) (routine) without abnormal findings 10/22/2017    Well female exam with routine gynecological exam    Encounter for immunization 12/28/2015    Influenza vaccination given    Exercise induced bronchospasm ([facility])     Exercise-induced asthma    Irregular menstruation, unspecified 12/02/2015    Missed period    Other conditions influencing health status     Human Papilloma Virus Infection Type 18    Personal history of diseases of the skin and subcutaneous tissue     History of eczema    Personal history of other diseases of the digestive system 07/01/2014    History of constipation    Personal history of other diseases of the digestive system 10/17/2014    History of esophageal reflux    Personal history of other diseases of the digestive system     History of esophageal reflux    Personal history of other diseases of the female genital tract 12/01/2015    History of amenorrhea    Personal history of other diseases of the respiratory system     History of asthma    Personal history of other diseases of the respiratory system 08/10/2018    History of sore throat    Personal history  of other drug therapy     History of vaccination against human papillomavirus    Personal history of other endocrine, nutritional and metabolic disease 2013    History of vitamin D deficiency    Personal history of other infectious and parasitic diseases     History of varicella    Personal history of other specified conditions 2014    History of abdominal pain    Unspecified injury of unspecified lower leg, initial encounter     Knee injury    Unspecified symptoms and signs involving the genitourinary system 2016    UTI symptoms       Past Psychiatric History:   Diagnoses:  ADHD - [facility]  in [place] ; MDD   Previous Psychiatrist: non  Therapy/past treatments:  when mom passed ;  then again in  six months   Current psychiatric medications:    Past psychiatric medications:  none  Hospitalizations:  none   Suicide attempts:   none   Family psychiatric history:  father undiagnosed ADHD; 8 year old ASD;  5 year old  ADHD     Social History:   Currently lives: ,   Education:  [employer] in , [employer]   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  Objective   Mental Status Exam  Appearance: casually dressed, fair g/h  Attitude: Calm, cooperative, and engaged in conversation.  Behavior:  "Appropriate eye contact.   Motor Activity: No psychomotor agitation or retardation. No abnormal movements, tremors or tics. No evidence of extrapyramidal symptoms or tardive dyskinesia.  Speech: Regular rate, rhythm, volume. Spontaneous, no pressured speech.  Mood: \"okay\"  Affect: Euthymic, full range, mood congruent.  Thought Process: Linear, logical, and goal-directed. No loose associations or gross thought disorganization.  Thought Content: Denied current suicidal ideation or thoughts of harm to self, denied homicidal ideation or thoughts of harm to others. No delusional thinking elicited. No perseverations or obsessions identified.   Perception: Did not endorse auditory or visual hallucinations, did not appear to be responding to hallucinatory stimuli.   Cognition: Alert, oriented x3. Preserved attention span and concentration, recent and remote memory. Adequate fund of knowledge. No deficits in language.   Insight: Fair, in regards to understanding mental health condition  Judgement: Fair      Vitals:  There were no vitals filed for this visit.    BMI:  31.39    Falls Risk:  n/a    Risk Assessment:  Risk of harm to self: low    Risk of harm to others: low  DXS:   ADHD, inattentive type    Assessment:  Patient is a 39 year old female diagnosed with ADHD, inattentive type.  Patient has some depressive symptoms as well and is agreeable to trial of Wellbutrin.      Discussed medication risks and benefits     Patient denies SI, HI, manic or psychotic symptoms.  No side effects reported.       Plan/Recommendations:  Medications: start Wellbutrin  mg PO daily  Follow up: 3-4 weeks   Call  Psychiatry at [phone] with issues.  For [facility] residents, [employer] is a 24/7 hotline you can call for assistance [[phone]]. Please call [phone] or go to your closest Emergency Room if you feel unsafe. This includes thoughts of hurting yourself or anyone else, or having other troubles such as hearing " voices, seeing visions, or having new and scary thoughts about the people around you.    Review with patient: Treatment plan reviewed with the patient.  Medication risks/benefit reviewed with the patient          Mariam Garcia MD

## 2024-08-23 DIAGNOSIS — Z79.890 MENOPAUSAL SYNDROME ON HORMONE REPLACEMENT THERAPY: ICD-10-CM

## 2024-08-23 DIAGNOSIS — N95.1 MENOPAUSAL SYNDROME ON HORMONE REPLACEMENT THERAPY: ICD-10-CM

## 2024-08-23 RX ORDER — PROGESTERONE 200 MG/1
CAPSULE ORAL
Qty: 14 CAPSULE | Refills: 11 | Status: SHIPPED | OUTPATIENT
Start: 2024-08-23

## 2024-08-23 RX ORDER — ESTRADIOL 1 MG/1
1 TABLET ORAL DAILY
Qty: 30 TABLET | Refills: 11 | Status: SHIPPED | OUTPATIENT
Start: 2024-08-23

## 2024-10-02 ENCOUNTER — APPOINTMENT (OUTPATIENT)
Dept: BEHAVIORAL HEALTH | Facility: CLINIC | Age: 39
End: 2024-10-02
Payer: COMMERCIAL

## 2024-10-02 DIAGNOSIS — F90.9 ATTENTION DEFICIT HYPERACTIVITY DISORDER (ADHD), UNSPECIFIED ADHD TYPE: ICD-10-CM

## 2024-10-02 PROCEDURE — 99214 OFFICE O/P EST MOD 30 MIN: CPT | Performed by: PSYCHIATRY & NEUROLOGY

## 2024-10-02 NOTE — PROGRESS NOTES
"  Virtual or Telephone Consent    An interactive audio and video telecommunication system which permits real time communications between the patient (at the originating site) and provider (at the distant site) was utilized to provide this telehealth service.   Verbal consent was requested and obtained from Elham Scott on this date, 10/02/2024 for a telehealth visit.      Subjective  Elham Scott, a 39 y.o. female, presenting to Psychiatry for evaluation.  Patient is referred by Tha Cardoza MD .      Patient said she is doing \"okay\"  She has been tired because her youngest child has been getting up a lot during the night  He has decided that he does not want to wear overnight diapers at night anymore but he is not yet fully ready to make it through the night  He was also just diagnosed with childhood absence seizures and started medication which make him tired at night  The patient started the Wellbutrin and has been feeling \"a little better\"  Feels like her anxiety has been \"muted\"   She feels her \"threshold to initiate a tasks\" is lower  She said she still feels very \"scattered\" though  Feels paralyzed at times and feels overwhelmed   Sleep is okay except week before her period and during it  Perimenopause - HRT has helped which she started Jan 2024   Did not do genetic testing but would like to sign up   Started lifting weights again which is helping with her pain   Back feels better - still having hip pain   Feels better now that the kids are back in school  Structure \"works for me\"  Feels life is more organized since school has started and enjoys having a schedule  Summer was fun but can be stressful because it is unstructured   Chronic insomnia   Can fall asleep but wakes up frequently  Quality of sleep has been less and less over time   Some improvement in Irritability   Feels less overwhelmed and is worrying a little less  Still feels she cannot do things in a \"linear fashion\" - feels " scattered often but this has improved a little bit    Patient denies SI, HI, manic or psychotic symptoms.        Psychiatric Review Of Systems:  Depressive Symptoms: improvement: anhedonia, concentration, and sleep decreased   Manic Symptoms: negative  Anxiety Symptoms: General Anxiety Disorder (JAYCOB)JAYCOB Behaviors: improvement: difficult to control worry, difficulty concentrating, easily fatigued, irritability, restlessness, and sleep disturbance  Psychotic Symptoms: negative  Other Symptoms:  ADHD - inattentive symptoms present      Current Medications:    Current Medications      Current Outpatient Medications:     docusate sodium (Colace) 50 mg/5 mL oral liquid, Take by mouth., Disp: , Rfl:     ibuprofen 600 mg tablet, Take 1 tablet (600 mg) by mouth every 6 hours if needed., Disp: , Rfl:     omega-3 acid ethyl esters (Lovaza) 1 gram capsule, Take 1 capsule (1 g) by mouth 2 times a day., Disp: , Rfl:     progesterone (Prometrium) 200 mg capsule, Take one pill by mouth at bedtime for 14 days/month, Disp: 14 capsule, Rfl: 11        Medical History:  Medical History        Past Medical History:   Diagnosis Date    Allergy to other foods 10/17/2014     History of food allergy    Chronic rhinitis 10/17/2014     Rhinitis    Encounter for gynecological examination (general) (routine) without abnormal findings 10/22/2017     Well female exam with routine gynecological exam    Encounter for immunization 12/28/2015     Influenza vaccination given    Ex[facility] induced bronchospasm ([facility])       Exercise-induced asthma    Irregular menstruation, unspecified 12/02/2015     Missed period    Other conditions influencing health status       Human Papilloma Virus Infection Type 18    Personal history of diseases of the skin and subcutaneous tissue       History of eczema    Personal history of other diseases of the digestive system 07/01/2014     History of constipation    Personal history of other diseases of the digestive  system 10/17/2014     History of esophageal reflux    Personal history of other diseases of the digestive system       History of esophageal reflux    Personal history of other diseases of the female genital tract 2015     History of amenorrhea    Personal history of other diseases of the respiratory system       History of asthma    Personal history of other diseases of the respiratory system 08/10/2018     History of sore throat    Personal history of other drug therapy       History of vaccination against human papillomavirus    Personal history of other endocrine, nutritional and metabolic disease 2013     History of vitamin D deficiency    Personal history of other infectious and parasitic diseases       History of varicella    Personal history of other specified conditions 2014     History of abdominal pain    Unspecified injury of unspecified lower leg, initial encounter       Knee injury    Unspecified symptoms and signs involving the genitourinary system 2016     UTI symptoms       - abdominal hernia       Past Psychiatric History:   Diagnoses:  ADHD - [facility]  in [place] ; MDD   Previous Psychiatrist: non  Therapy/past treatments:  when mom passed ;  then again in  six months   Current psychiatric medications:    Past psychiatric medications:  none  Hospitalizations:  none   Suicide attempts:   none   Family psychiatric history:  father undiagnosed ADHD; 8 year old ASD;  5 year old  ADHD      Social History:   Currently lives:    Education:  [employer] in HS, Grad [employer]   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[employer] ;  is   - [employer]   Family of origin:  father  2023  Marital history/children:  youngest is five, 8   Current stressors:  ADHD  Social support:   Legal History:  none   History: unknown  History of violence:  "unknown  Access to Weapons:   Interests:     Substance Use History:   will review at next appointment  Tobacco use: denies  Use of alcohol:     Use of caffeine:     Use of other substances:   Legal consequences of substance use:   Substance use disorder treatment:      Record Review: brief     Medical Review Of Systems:  Pertinent items are noted in HPI.        Objective  Mental Status Exam  Appearance: casually dressed, fair g/h, virtual   Attitude: Calm, cooperative, and engaged in conversation.  Behavior: Appropriate eye contact.   Motor Activity: No psychomotor agitation or retardation. No abnormal movements, tremors or tics. No evidence of extrapyramidal symptoms or tardive dyskinesia.  Speech: Regular rate, rhythm, volume. Spontaneous, no pressured speech.  Mood: \"a little better\"  Affect: Euthymic, full range, mood congruent.  Thought Process: Linear, logical, and goal-directed. No loose associations or gross thought disorganization.  Thought Content: Denied current suicidal ideation or thoughts of harm to self, denied homicidal ideation or thoughts of harm to others. No delusional thinking elicited. No perseverations or obsessions identified.   Perception: Did not endorse auditory or visual hallucinations, did not appear to be responding to hallucinatory stimuli.   Cognition: Alert, oriented x3. Preserved attention span and concentration, recent and remote memory. Adequate fund of knowledge. No deficits in language.   Insight: Fair, in regards to understanding mental health condition  Judgement: Fair        Vitals:  There were no vitals filed for this visit.     BMI:  31.39     Falls Risk:  n/a     Risk Assessment:  Risk of harm to self: low     Risk of harm to others: low  DXS:   ADHD, inattentive type  JAYCOB  Depression, unspecified     Assessment:  Patient is a 39 year old female diagnosed with ADHD, inattentive type, JAYCOB and depression, unspecified..  Patient feels some improvement on the Wellbutrin  " mg PO daily with some decrease in irritability, restlessness, increased focus and concentration but feels there is more room for improvement.  Will increase the Wellbutrin XL to 300 mg PO daily.        Discussed medication risks and benefits      Patient denies SI, HI, manic or psychotic symptoms.  No side effects reported.        Plan/Recommendations:  Medications: increase Wellbutrin XL to 300 mg PO daily  Follow up: 6 weeks   Call  Psychiatry at [phone] with issues.  For [facility], [employer] is a 24/7 hotline you can call for assistance [[phone]]. Please call [phone] or go to your closest Emergency Room if you feel unsafe. This includes thoughts of hurting yourself or anyone else, or having other troubles such as hearing voices, seeing visions, or having new and scary thoughts about the people around you.     Review with patient: Treatment plan reviewed with the patient.  Medication risks/benefit reviewed with the patient

## 2024-10-03 RX ORDER — BUPROPION HYDROCHLORIDE 300 MG/1
300 TABLET ORAL DAILY
Qty: 30 TABLET | Refills: 2 | Status: SHIPPED | OUTPATIENT
Start: 2024-10-03 | End: 2025-01-01

## 2024-11-13 ENCOUNTER — APPOINTMENT (OUTPATIENT)
Dept: BEHAVIORAL HEALTH | Facility: CLINIC | Age: 39
End: 2024-11-13
Payer: COMMERCIAL

## 2024-11-13 DIAGNOSIS — F90.9 ATTENTION DEFICIT HYPERACTIVITY DISORDER (ADHD), UNSPECIFIED ADHD TYPE: ICD-10-CM

## 2024-11-13 PROCEDURE — 1036F TOBACCO NON-USER: CPT | Performed by: PSYCHIATRY & NEUROLOGY

## 2024-11-13 PROCEDURE — 99214 OFFICE O/P EST MOD 30 MIN: CPT | Performed by: PSYCHIATRY & NEUROLOGY

## 2024-11-13 RX ORDER — BUPROPION HYDROCHLORIDE 150 MG/1
150 TABLET ORAL EVERY MORNING
Qty: 30 TABLET | Refills: 2 | Status: SHIPPED | OUTPATIENT
Start: 2024-11-20 | End: 2025-02-18

## 2024-11-13 RX ORDER — GUANFACINE 1 MG/1
1 TABLET, EXTENDED RELEASE ORAL DAILY
Qty: 30 TABLET | Refills: 2 | Status: SHIPPED | OUTPATIENT
Start: 2024-11-13 | End: 2025-02-11

## 2024-11-13 NOTE — PROGRESS NOTES
"       Virtual or Telephone Consent     An interactive audio and video telecommunication system which permits real time communications between the patient (at the originating site) and provider (at the distant site) was utilized to provide this telehealth service.   Verbal consent was requested and obtained from Elham Scott on this date, 11/13/2024 for a telehealth visit.      Subjective  Elham Scott, a 39 y.o. female, presenting to Psychiatry for evaluation.  Patient is referred by Tha Cardoza MD .        Kids are doing well  Exploring some medications for autistic child - does not do well in group settings  Is going to speak with Pediatrician about medications for him   Feeling okay  Has not been sleeping well - wakes up during the night  Has not been able to fall asleep for a few weeks but has been better in past few nights  Found connection with cycle and her sleep issues   General fatigue before her period  Increased the Wellbutrin XL - has not felt a difference   Things are a little \"easier\" and effort is less to get things done  Wondering if she should go back to therapy or possibly an ADHD    Just got two kittens  Hoping to get a therapy dog  Considering [facility] for counseling  Feels like her anxiety has been \"muted\"   She feels her \"threshold to initiate a tasks\" is lower  She said she still feels very \"scattered\" though  Feels paralyzed at times and feels overwhelmed but this is happening less frequently  Still feels she cannot do things in a \"linear fashion\" - feels scattered often but this has improved a little bit    Patient denies SI, HI, manic or psychotic symptoms.        Psychiatric Review Of Systems:  Depressive Symptoms: improvement: anhedonia, concentration, and sleep decreased   Manic Symptoms: negative  Anxiety Symptoms: General Anxiety Disorder (JAYCOB)JAYCOB Behaviors: improvement: difficult to control worry, difficulty concentrating, easily fatigued, irritability, " restlessness, and sleep disturbance  Psychotic Symptoms: negative  Other Symptoms:  ADHD - inattentive symptoms present      Current Medications:     Current Medications      Current Outpatient Medications:     docusate sodium (Colace) 50 mg/5 mL oral liquid, Take by mouth., Disp: , Rfl:     ibuprofen 600 mg tablet, Take 1 tablet (600 mg) by mouth every 6 hours if needed., Disp: , Rfl:     omega-3 acid ethyl esters (Lovaza) 1 gram capsule, Take 1 capsule (1 g) by mouth 2 times a day., Disp: , Rfl:     progesterone (Prometrium) 200 mg capsule, Take one pill by mouth at bedtime for 14 days/month, Disp: 14 capsule, Rfl: 11    - Wellbutrin  mg PO Daily      Medical History:  Medical History           Past Medical History:   Diagnosis Date    Allergy to other foods 10/17/2014     History of food allergy    Chronic rhinitis 10/17/2014     Rhinitis    Encounter for gynecological examination (general) (routine) without abnormal findings 10/22/2017     Well female exam with routine gynecological exam    Encounter for immunization 12/28/2015     Influenza vaccination given    Exercise induced bronchospasm ([facility])       Exercise-induced asthma    Irregular menstruation, unspecified 12/02/2015     Missed period    Other conditions influencing health status       Human Papilloma Virus Infection Type 18    Personal history of diseases of the skin and subcutaneous tissue       History of eczema    Personal history of other diseases of the digestive system 07/01/2014     History of constipation    Personal history of other diseases of the digestive system 10/17/2014     History of esophageal reflux    Personal history of other diseases of the digestive system       History of esophageal reflux    Personal history of other diseases of the female genital tract 12/01/2015     History of amenorrhea    Personal history of other diseases of the respiratory system       History of asthma    Personal history of other diseases of  the respiratory system 08/10/2018     History of sore throat    Personal history of other drug therapy       History of vaccination against human papillomavirus    Personal history of other endocrine, nutritional and metabolic disease 2013     History of vitamin D deficiency    Personal history of other infectious and parasitic diseases       History of varicella    Personal history of other specified conditions 2014     History of abdominal pain    Unspecified injury of unspecified lower leg, initial encounter       Knee injury    Unspecified symptoms and signs involving the genitourinary system 2016     UTI symptoms       - abdominal hernia        Past Psychiatric History:   Diagnoses:  ADHD - [facility]  in [place] ; MDD   Previous Psychiatrist: non  Therapy/past treatments:  when mom passed ;  then again in  six months   Current psychiatric medications:  Wellbutrin XL   Past psychiatric medications:  none  Hospitalizations:  none   Suicide attempts:   none   Family psychiatric history:  father undiagnosed ADHD; 8 year old ASD;  5 year old  ADHD      Social History:   Currently lives:    Education:  [place] in , Grad [employer]'s [employer]   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[employer] ;  is   - [employer]   Family of origin:  father  2023  Marital history/children:  youngest is five, 8   Current stressors:  ADHD  Social support:   Legal History:  none   History: unknown  History of violence: unknown  Access to Weapons:   Interests:     Substance Use History:   will review at next appointment  Tobacco use: denies  Use of alcohol:     Use of caffeine:     Use of other substances:   Legal consequences of substance use:   Substance use disorder treatment:      Record Review: brief     Medical Review Of Systems:  Pertinent items are noted in HPI.       "  Objective  Mental Status Exam  Appearance: casually dressed, fair g/h, virtual   Attitude: Calm, cooperative, and engaged in conversation.  Behavior: Appropriate eye contact.   Motor Activity: No psychomotor agitation or retardation. No abnormal movements, tremors or tics. No evidence of extrapyramidal symptoms or tardive dyskinesia.  Speech: Regular rate, rhythm, volume. Spontaneous, no pressured speech.  Mood: \"okay\"  Affect: Euthymic, full range, mood congruent.  Thought Process: Linear, logical, and goal-directed. No loose associations or gross thought disorganization.  Thought Content: Denied current suicidal ideation or thoughts of harm to self, denied homicidal ideation or thoughts of harm to others. No delusional thinking elicited. No perseverations or obsessions identified.   Perception: Did not endorse auditory or visual hallucinations, did not appear to be responding to hallucinatory stimuli.   Cognition: Alert, oriented x3. Preserved attention span and concentration, recent and remote memory. Adequate fund of knowledge. No deficits in language.   Insight: Fair, in regards to understanding mental health condition  Judgement: Fair        Vitals:  There were no vitals filed for this visit.     BMI:  31.39     Falls Risk:  n/a     Risk Assessment:  Risk of harm to self: low     Risk of harm to others: low  DXS:   ADHD, inattentive type  JAYCOB  Depression, unspecified     Assessment:  Patient is a 39 year old female diagnosed with ADHD, inattentive type, JAYCOB and depression, unspecified..  Patient does not feel the increased dose of the Wellbutrin has helped at all.  Patient is still struggling with inattention, problems focusing and disorganization.  Is agreeable to trial of Intuniv for ADHD.  Will also return to lower dose of Wellbutrin as the higher dose has not made a difference and possibly may have effected her sleep.      Discussed medication risks and benefits      Patient denies SI, HI, manic or " psychotic symptoms.  No side effects reported.        Plan/Recommendations:  Medications: decrease Wellbutrin XL to 150 mg PO daily  Start Intuniv 1 mg PO daily   Follow up: 6 weeks   Call  Psychiatry at [phone] with issues.  For [facility] residents, [place] is a 24/7 hotline you can call for assistance [[phone]]. Please call [phone] or go to your closest Emergency Room if you feel unsafe. This includes thoughts of hurting yourself or anyone else, or having other troubles such as hearing voices, seeing visions, or having new and scary thoughts about the people around you.     Review with patient: Treatment plan reviewed with the patient.  Medication risks/benefit reviewed with the patient         oumar

## 2024-12-17 ENCOUNTER — APPOINTMENT (OUTPATIENT)
Dept: OBSTETRICS AND GYNECOLOGY | Facility: CLINIC | Age: 39
End: 2024-12-17
Payer: COMMERCIAL

## 2024-12-17 VITALS — BODY MASS INDEX: 30.7 KG/M2 | DIASTOLIC BLOOD PRESSURE: 70 MMHG | WEIGHT: 196 LBS | SYSTOLIC BLOOD PRESSURE: 100 MMHG

## 2024-12-17 DIAGNOSIS — N95.1 MENOPAUSAL SYNDROME ON HORMONE REPLACEMENT THERAPY: ICD-10-CM

## 2024-12-17 DIAGNOSIS — Z12.31 BREAST CANCER SCREENING BY MAMMOGRAM: ICD-10-CM

## 2024-12-17 DIAGNOSIS — Z01.419 WELL WOMAN EXAM WITH ROUTINE GYNECOLOGICAL EXAM: Primary | ICD-10-CM

## 2024-12-17 DIAGNOSIS — Z79.890 MENOPAUSAL SYNDROME ON HORMONE REPLACEMENT THERAPY: ICD-10-CM

## 2024-12-17 PROCEDURE — 99396 PREV VISIT EST AGE 40-64: CPT | Performed by: NURSE PRACTITIONER

## 2024-12-17 RX ORDER — ESTRADIOL 1 MG/1
1 TABLET ORAL DAILY
Qty: 30 TABLET | Refills: 11 | Status: SHIPPED | OUTPATIENT
Start: 2024-12-17

## 2024-12-17 RX ORDER — PROGESTERONE 200 MG/1
CAPSULE ORAL
Qty: 14 CAPSULE | Refills: 11 | Status: SHIPPED | OUTPATIENT
Start: 2024-12-17

## 2024-12-17 ASSESSMENT — ENCOUNTER SYMPTOMS
PSYCHIATRIC NEGATIVE: 0
NEUROLOGICAL NEGATIVE: 0
RESPIRATORY NEGATIVE: 0
CARDIOVASCULAR NEGATIVE: 0
ALLERGIC/IMMUNOLOGIC NEGATIVE: 0
EYES NEGATIVE: 0
HEMATOLOGIC/LYMPHATIC NEGATIVE: 0
CONSTITUTIONAL NEGATIVE: 0
MUSCULOSKELETAL NEGATIVE: 0
GASTROINTESTINAL NEGATIVE: 0
ENDOCRINE NEGATIVE: 0

## 2024-12-17 NOTE — PROGRESS NOTES
Subjective   Patient ID: Elham Scott is a 39 y.o. female who presents for Annual Exam (Last PAP= 12/5/2023 negative //Last MAMMO= 1/16/2024//[facility], II/).  HPI  Concerns:   H/o depression and ADHD  Menopausal age: perimenopausal   Menses regular and monthly with 2 days of very light bleeding, no dysmenorrhea     Any Contraindications to HT: personal h/o breast cancer, estrogen sensitive cancer, dementia, stroke, MI, VTE or inherited high risk for VTE, SCAD: none  h/o congenital heart disease (increased risk of DVT) none     contraception:  none, vasectomy  Previous h/o weight gain with OCP  HT: initially prescribed 2/2024; 0.5mg po estradiol and 200mg po MP for 14 days/month  6/2024 estradiol increased from 0.5mg to 1mg  Overall very happy with MHT    use of OTC remedies: vitamins  bioidenticals: none        VMS: none  Sleep difficulties: yes, interrupted sleep; sleepy time tea and magnesium; but overal quality improved  Mood changes: improved  Joint pain: improved  Brain fog/difficulty concentrating: improved     GSM: none  are you sexually active: yes  dyspareunia: none  decrease in desire: improved  difficulty reaching orgasm:  no  Urinary Incontinence: none          Fractures: none     H/o STI: none  Requesting screening: no    FH breast cancer: maternal grandmother  FH ovarian cancer: none  FH colon cancer: paternal grandfather  FH pancreatic cancer: none        Review of Systems    Objective   Physical Exam  Vitals and nursing note reviewed.   Constitutional:       Appearance: Normal appearance.   Cardiovascular:      Rate and Rhythm: Normal rate and regular rhythm.      Heart sounds: Normal heart sounds.   Pulmonary:      Effort: Pulmonary effort is normal.      Breath sounds: Normal breath sounds.   Chest:   Breasts:     Right: Normal.      Left: Normal.   Abdominal:      Tenderness: There is no abdominal tenderness.   Genitourinary:     General: Normal vulva.      Exam position: Lithotomy  position.      Labia:         Right: No lesion.         Left: No lesion.    Skin:     General: Skin is warm and dry.   Neurological:      Mental Status: She is alert.   Psychiatric:         Attention and Perception: Attention normal.         Mood and Affect: Mood normal.         Speech: Speech normal.         Behavior: Behavior normal.         Thought Content: Thought content normal.         Cognition and Memory: Cognition and memory normal.         Judgment: Judgment normal.         Assessment/Plan   Diagnoses and all orders for this visit:  Well woman exam with routine gynecological exam  Breast cancer screening by mammogram  -     BI mammo bilateral screening tomosynthesis; Future  Menopausal syndrome on hormone replacement therapy  -     progesterone (Prometrium) 200 mg capsule; Take one pill by mouth at bedtime for 14 days/month  -     estradiol (Estrace) 1 mg tablet; Take 1 tablet (1 mg) by mouth once daily.           SHAKIR Jaimes-CNP 12/17/24 10:51 AM

## 2025-01-10 ENCOUNTER — APPOINTMENT (OUTPATIENT)
Dept: PRIMARY CARE | Facility: CLINIC | Age: 40
End: 2025-01-10
Payer: COMMERCIAL

## 2025-01-10 VITALS
HEART RATE: 70 BPM | WEIGHT: 198 LBS | BODY MASS INDEX: 31.08 KG/M2 | OXYGEN SATURATION: 97 % | HEIGHT: 67 IN | DIASTOLIC BLOOD PRESSURE: 72 MMHG | SYSTOLIC BLOOD PRESSURE: 118 MMHG

## 2025-01-10 DIAGNOSIS — Z00.00 HEALTHCARE MAINTENANCE: Primary | ICD-10-CM

## 2025-01-10 DIAGNOSIS — E66.09 CLASS 1 OBESITY DUE TO EXCESS CALORIES WITHOUT SERIOUS COMORBIDITY WITH BODY MASS INDEX (BMI) OF 31.0 TO 31.9 IN ADULT: ICD-10-CM

## 2025-01-10 DIAGNOSIS — J45.990 EXERCISE-INDUCED ASTHMA (HHS-HCC): ICD-10-CM

## 2025-01-10 DIAGNOSIS — E66.811 CLASS 1 OBESITY DUE TO EXCESS CALORIES WITHOUT SERIOUS COMORBIDITY WITH BODY MASS INDEX (BMI) OF 31.0 TO 31.9 IN ADULT: ICD-10-CM

## 2025-01-10 DIAGNOSIS — Z76.89 ENCOUNTER TO ESTABLISH CARE: ICD-10-CM

## 2025-01-10 DIAGNOSIS — F43.21 ADJUSTMENT DISORDER WITH DEPRESSED MOOD: ICD-10-CM

## 2025-01-10 PROBLEM — K59.00 CONSTIPATION: Status: RESOLVED | Noted: 2024-07-08 | Resolved: 2025-01-10

## 2025-01-10 PROBLEM — N76.2 VULVITIS: Status: RESOLVED | Noted: 2023-05-23 | Resolved: 2025-01-10

## 2025-01-10 PROBLEM — M25.551 BILATERAL HIP PAIN: Status: RESOLVED | Noted: 2024-02-21 | Resolved: 2025-01-10

## 2025-01-10 PROBLEM — R87.810 HIGH RISK HUMAN PAPILLOMAVIRUS (HPV) DETECTED: Status: RESOLVED | Noted: 2025-01-10 | Resolved: 2025-01-10

## 2025-01-10 PROBLEM — E66.3 OVERWEIGHT WITH BODY MASS INDEX (BMI) OF 29 TO 29.9 IN ADULT: Status: RESOLVED | Noted: 2023-09-14 | Resolved: 2025-01-10

## 2025-01-10 PROBLEM — R87.810 HIGH RISK HUMAN PAPILLOMAVIRUS (HPV) DETECTED: Status: ACTIVE | Noted: 2025-01-10

## 2025-01-10 PROBLEM — R19.7 ACUTE DIARRHEA: Status: RESOLVED | Noted: 2023-05-23 | Resolved: 2025-01-10

## 2025-01-10 PROBLEM — M25.552 BILATERAL HIP PAIN: Status: RESOLVED | Noted: 2024-02-21 | Resolved: 2025-01-10

## 2025-01-10 PROCEDURE — 1036F TOBACCO NON-USER: CPT

## 2025-01-10 PROCEDURE — 99396 PREV VISIT EST AGE 40-64: CPT

## 2025-01-10 PROCEDURE — 3008F BODY MASS INDEX DOCD: CPT

## 2025-01-10 ASSESSMENT — ENCOUNTER SYMPTOMS
DEPRESSION: 0
LOSS OF SENSATION IN FEET: 0
OCCASIONAL FEELINGS OF UNSTEADINESS: 0

## 2025-01-10 NOTE — PROGRESS NOTES
"Reason for Visit: Annual Physical Exam/ est care    HPI:  HPI  Est care  Health Maintenance    Hip pain resolved; just occ lumbar back pain, mild, doing much better  Exercise: regularly, does weight lifting as well  Diet: well-balanced  Following with [facility] for HRT- doing well  Mammogram: scheduled for 1/21/25  PAP test: UTD  Colonoscopy: due at age 43 unless otherwise indicated  Cholesterol panel:   Lab Results   Component Value Date    CHOL 176 12/27/2023    CHOL 183 09/27/2022    CHOL 173 09/22/2021     Lab Results   Component Value Date    HDL 55.2 12/27/2023    HDL 55.5 09/27/2022    HDL 48.9 09/22/2021      Lab Results   Component Value Date    LDLCALC 104 (H) 12/27/2023     Lab Results   Component Value Date    TRIG 83 12/27/2023    TRIG 87 09/27/2022    TRIG 75 09/22/2021     No components found for: \"CHOLHDL\"  TDAP: UTD  Influenza vaccine: UTD  Eye Examinations: UTD  Dental Examinations: UTD    Adjustment disorder  Following with psychiatry for possible ADD? On Wellbutrin  PHQ-2: 0    Hx of exercise induced asthma- no S/s ; no SOB      Active Problem List  Patient Active Problem List   Diagnosis    Adjustment disorder with depressed mood    WBC decreased    Acute blood loss anemia    SI (sacroiliac) joint dysfunction    Bilateral sciatica    Encounter to establish care    Ex[facility]-induced asthma ([facility])    History of varicella    Knee injury    Menopausal syndrome    Class 1 obesity due to excess calories without serious comorbidity with body mass index (BMI) of 31.0 to 31.9 in adult       Comprehensive Medical/Surgical/Social/Family History  Past Medical History:   Diagnosis Date    Allergy to other foods 10/17/2014    History of food allergy    Chronic rhinitis 10/17/2014    Rhinitis    Encounter for gynecological examination (general) (routine) without abnormal findings 10/22/2017    Well female exam with routine gynecological exam    Encounter for immunization 12/28/2015    Influenza vaccination " given    Exercise induced bronchospasm ([facility])     Exercise-induced asthma    High risk human papillomavirus (HPV) detected 01/10/2025    Irregular menstruation, unspecified 2015    Missed period    Other conditions influencing health status     Human Papilloma Virus Infection Type 18    Personal history of diseases of the skin and subcutaneous tissue     History of eczema    Personal history of other diseases of the digestive system 2014    History of constipation    Personal history of other diseases of the digestive system 10/17/2014    History of esophageal reflux    Personal history of other diseases of the digestive system     History of esophageal reflux    Personal history of other diseases of the female genital tract 2015    History of amenorrhea    Personal history of other diseases of the respiratory system     History of asthma    Personal history of other diseases of the respiratory system 08/10/2018    History of sore throat    Personal history of other drug therapy     History of vaccination against human papillomavirus    Personal history of other endocrine, nutritional and metabolic disease 2013    History of vitamin D deficiency    Personal history of other infectious and parasitic diseases     History of varicella    Personal history of other specified conditions 2014    History of abdominal pain    Unspecified injury of unspecified lower leg, initial encounter     Knee injury    Unspecified symptoms and signs involving the genitourinary system 2016    UTI symptoms     Past Surgical History:   Procedure Laterality Date    ADENOIDECTOMY  2013    Adenoidectomy    APPENDECTOMY  06/10/2014    Appendectomy    COLPOSCOPY  2015    Colposcopy    MOUTH SURGERY  2015    Oral Surgery Tooth Extraction    OTHER SURGICAL HISTORY  2013    Partial Hymenectomy    OTHER SURGICAL HISTORY  2020     section    TONSILLECTOMY  2013     "Tonsillectomy     Social History     Social History Narrative    Not on file         Allergies and Medications  Sulfa (sulfonamide antibiotics)  Current Outpatient Medications on File Prior to Visit   Medication Sig Dispense Refill    buPROPion XL (Wellbutrin XL) 150 mg 24 hr tablet Take 1 tablet (150 mg) by mouth once daily in the morning. Do not crush, chew, or split. Do not fill before November 20, 2024. 30 tablet 2    docusate sodium (Colace) 50 mg/5 mL oral liquid Take by mouth.      estradiol (Estrace) 1 mg tablet Take 1 tablet (1 mg) by mouth once daily. 30 tablet 11    guanFACINE (Intuniv ER) 1 mg ER 24 hr tablet Take 1 tablet (1 mg) by mouth once daily. 30 tablet 2    omega-3 acid ethyl esters (Lovaza) 1 gram capsule Take 1 capsule (1 g) by mouth 2 times a day.      progesterone (Prometrium) 200 mg capsule Take one pill by mouth at bedtime for 14 days/month 14 capsule 11    [DISCONTINUED] ibuprofen 600 mg tablet Take 1 tablet (600 mg) by mouth every 6 hours if needed.       No current facility-administered medications on file prior to visit.         ROS otherwise negative aside from what was mentioned above in HPI.  Review of Systems      Vitals  /72 Comment: blood reads low machine unable to read  Pulse 70   Ht 1.702 m (5' 7\")   Wt 89.8 kg (198 lb)   SpO2 97%   BMI 31.01 kg/m²   Body mass index is 31.01 kg/m².    Physical Exam  Physical Exam  Vitals reviewed.   Constitutional:       General: She is not in acute distress.     Appearance: Normal appearance. She is normal weight. She is not ill-appearing, toxic-appearing or diaphoretic.   HENT:      Head: Normocephalic and atraumatic.      Right Ear: Tympanic membrane, ear canal and external ear normal. There is no impacted cerumen.      Left Ear: Tympanic membrane, ear canal and external ear normal. There is no impacted cerumen.      Nose: Nose normal.   Eyes:      Conjunctiva/sclera: Conjunctivae normal.   Cardiovascular:      Rate and Rhythm: " Normal rate and regular rhythm.      Pulses: Normal pulses.      Heart sounds: Normal heart sounds. No murmur heard.     No friction rub. No gallop.   Pulmonary:      Effort: Pulmonary effort is normal. No respiratory distress.      Breath sounds: Normal breath sounds.   Abdominal:      General: Abdomen is flat. Bowel sounds are normal.      Palpations: Abdomen is soft.   Musculoskeletal:         General: Normal range of motion.      Cervical back: Normal range of motion and neck supple.   Lymphadenopathy:      Cervical: No cervical adenopathy.   Skin:     General: Skin is warm and dry.      Capillary Refill: Capillary refill takes less than 2 seconds.   Neurological:      General: No focal deficit present.      Mental Status: She is alert and oriented to person, place, and time. Mental status is at baseline.   Psychiatric:         Mood and Affect: Mood normal.         Behavior: Behavior normal.         Thought Content: Thought content normal.         Judgment: Judgment normal.           Assessment and Plan:  Problem List Items Addressed This Visit    CPE         ICD-10-CM    Adjustment disorder with depressed mood F43.21    Stable  C/w Wellbutrin; continue following with psychiatry  Relevant Orders    Lipid Panel    CBC and Auto Differential    Comprehensive metabolic panel    Encounter to establish care - Primary Z76.89    Relevant Orders    Lipid Panel    CBC and Auto Differential    Comprehensive metabolic panel    Exercise-induced asthma ([facility]) J45.990    stable  Relevant Orders    Lipid Panel    CBC and Auto Differential    Comprehensive metabolic panel    Class 1 obesity due to excess calories without serious comorbidity with body mass index (BMI) of 31.0 to 31.9 in adult E66.811, E66.09, Z68.31    Stable; c/w healthier lifestyle changes   Relevant Orders    Lipid Panel    CBC and Auto Differential    Comprehensive metabolic panel     HM:  Exercise: regularly, does weight lifting as well  Diet:  well-balanced  Following with [facility] for HRT- doing well  Mammogram: scheduled for 1/21/25  PAP test: UTD  Colonoscopy: due at age 43 unless otherwise indicated  TDAP: UTD  Influenza vaccine: UTD  Eye Examinations: UTD  Dental Examinations: UTD    Encourage diet and exercise to maintain healthy lifestyle.     Follow up in 1 year with yearly wellness exam    Kiana Valencia, SHAKIR-CNP

## 2025-01-14 ENCOUNTER — APPOINTMENT (OUTPATIENT)
Dept: BEHAVIORAL HEALTH | Facility: CLINIC | Age: 40
End: 2025-01-14
Payer: COMMERCIAL

## 2025-01-14 DIAGNOSIS — F90.9 ATTENTION DEFICIT HYPERACTIVITY DISORDER (ADHD), UNSPECIFIED ADHD TYPE: ICD-10-CM

## 2025-01-14 PROCEDURE — 99214 OFFICE O/P EST MOD 30 MIN: CPT | Performed by: PSYCHIATRY & NEUROLOGY

## 2025-01-14 RX ORDER — METHYLPHENIDATE HYDROCHLORIDE 18 MG/1
18 TABLET ORAL DAILY
Qty: 30 TABLET | Refills: 0 | Status: SHIPPED | OUTPATIENT
Start: 2025-01-14 | End: 2025-02-13

## 2025-01-14 NOTE — PROGRESS NOTES
"       Virtual or Telephone Consent     An interactive audio and video telecommunication system which permits real time communications between the patient (at the originating site) and provider (at the distant site) was utilized to provide this telehealth service.   Verbal consent was requested and obtained from Elham Scott on this date, 01/14/2025 for a telehealth visit.      Subjective  Elham Scott, a 40 y.o. female, presenting to Psychiatry for evaluation.  Patient is referred by Tha Cardoza MD .       Patient is doing well   Holidays were \"chill\"  January starts getting busy   Does not feel different on Intuniv  Still notices that during the day she has to \"zone out\" in order to get something done  Has been a chronic problem  Feels less \"manic\" about things so maybe noticing it more  Sleep is up and down  Only taking progresterone for half of a month which GYN said could be  8 year old son just started Concerta and is doing better - more organized  Found connection with cycle and her sleep issues   General fatigue before her period  Increased the Wellbutrin XL - has not felt a difference   Things are a little \"easier\" and effort is less to get things done  She feels her \"threshold to initiate tasks\" is lower  She said she still feels very \"scattered\" though  Feels paralyzed at times and feels overwhelmed but this is happening less frequently  Still feels she cannot do things in a \"linear fashion\" - feels scattered often but this has improved a little bit    Patient denies SI, HI, manic or psychotic symptoms.        Psychiatric Review Of Systems:  Depressive Symptoms: improvement: anhedonia, concentration, and sleep decreased   Manic Symptoms: negative  Anxiety Symptoms: General Anxiety Disorder (JAYOCB)JAYCOB Behaviors: improvement: difficult to control worry, difficulty concentrating, easily fatigued, irritability, restlessness, and sleep disturbance  Psychotic Symptoms: negative  Other Symptoms:  " ADHD - inattentive symptoms present      Current Medications:     Current Medications      Current Outpatient Medications:     docusate sodium (Colace) 50 mg/5 mL oral liquid, Take by mouth., Disp: , Rfl:     ibuprofen 600 mg tablet, Take 1 tablet (600 mg) by mouth every 6 hours if needed., Disp: , Rfl:     omega-3 acid ethyl esters (Lovaza) 1 gram capsule, Take 1 capsule (1 g) by mouth 2 times a day., Disp: , Rfl:     progesterone (Prometrium) 200 mg capsule, Take one pill by mouth at bedtime for 14 days/month, Disp: 14 capsule, Rfl: 11    - Wellbutrin  mg PO Daily    - Intuniv 1 mg PO daily     Medical History:  Medical History           Past Medical History:   Diagnosis Date    Allergy to other foods 10/17/2014     History of food allergy    Chronic rhinitis 10/17/2014     Rhinitis    Encounter for gynecological examination (general) (routine) without abnormal findings 10/22/2017     Well female exam with routine gynecological exam    Encounter for immunization 12/28/2015     Influenza vaccination given    Ex[facility] bronchospasm ([facility])       Exercise-induced asthma    Irregular menstruation, unspecified 12/02/2015     Missed period    Other conditions influencing health status       Human Papilloma Virus Infection Type 18    Personal history of diseases of the skin and subcutaneous tissue       History of eczema    Personal history of other diseases of the digestive system 07/01/2014     History of constipation    Personal history of other diseases of the digestive system 10/17/2014     History of esophageal reflux    Personal history of other diseases of the digestive system       History of esophageal reflux    Personal history of other diseases of the female genital tract 12/01/2015     History of amenorrhea    Personal history of other diseases of the respiratory system       History of asthma    Personal history of other diseases of the respiratory system 08/10/2018     History of sore throat     Personal history of other drug therapy       History of vaccination against human papillomavirus    Personal history of other endocrine, nutritional and metabolic disease 2013     History of vitamin D deficiency    Personal history of other infectious and parasitic diseases       History of varicella    Personal history of other specified conditions 2014     History of abdominal pain    Unspecified injury of unspecified lower leg, initial encounter       Knee injury    Unspecified symptoms and signs involving the genitourinary system 2016     UTI symptoms       - abdominal hernia        Past Psychiatric History:   Diagnoses:  ADHD - [facility]  in [place] ; MDD   Previous Psychiatrist: non  Therapy/past treatments:  when mom passed ;  then again in  six months   Current psychiatric medications:  Wellbutrin XL   Past psychiatric medications:  none  Hospitalizations:  none   Suicide attempts:   none   Family psychiatric history:  father undiagnosed ADHD; 8 year old ASD;  5 year old  ADHD      Social History:   Currently lives:    Education:  [employer] in , [employer]'s [employer]   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[employer] ;  is   - [employer]   Family of origin:  father  2023  Marital history/children:  youngest is five, 8   Current stressors:  ADHD  Social support:   Legal History:  none   History: unknown  History of violence: unknown  Access to Weapons:   Interests:     Substance Use History:   will review at next appointment  Tobacco use: denies  Use of alcohol:     Use of caffeine:     Use of other substances:   Legal consequences of substance use:   Substance use disorder treatment:      Record Review: brief     Medical Review Of Systems:  Pertinent items are noted in HPI.        Objective  Mental Status Exam  Appearance: casually dressed,  "fair g/h, virtual   Attitude: Calm, cooperative, and engaged in conversation.  Behavior: Appropriate eye contact.   Motor Activity: No psychomotor agitation or retardation. No abnormal movements, tremors or tics. No evidence of extrapyramidal symptoms or tardive dyskinesia.  Speech: Regular rate, rhythm, volume. Spontaneous, no pressured speech.  Mood: \"okay\"  Affect: Euthymic, full range, mood congruent.  Thought Process: Linear, logical, and goal-directed. No loose associations or gross thought disorganization.  Thought Content: Denied current suicidal ideation or thoughts of harm to self, denied homicidal ideation or thoughts of harm to others. No delusional thinking elicited. No perseverations or obsessions identified.   Perception: Did not endorse auditory or visual hallucinations, did not appear to be responding to hallucinatory stimuli.   Cognition: Alert, oriented x3. Preserved attention span and concentration, recent and remote memory. Adequate fund of knowledge. No deficits in language.   Insight: Fair, in regards to understanding mental health condition  Judgement: Fair        Vitals:  There were no vitals filed for this visit.     BMI:  31.39     Falls Risk:  n/a     Risk Assessment:  Risk of harm to self: low     Risk of harm to others: low  DXS:   ADHD, inattentive type  JAYCOB  Depression, unspecified     Assessment:  Patient is a 40 year old female diagnosed with ADHD, inattentive type, JAYCOB and depression, unspecified..  Patient feels the same on the lower dose of the Wellbutrin.  Also feels the Intuniv has not helped at all.  Patient would like to try a stimulant and will start Concerta 18 mg PO daily.    Patient is still struggling with inattention, problems focusing and disorganization.         Discussed medication risks and benefits      Patient denies SI, HI, manic or psychotic symptoms.  No side effects reported.        Plan/Recommendations:  Medications: continue Wellbutrin XL to 150 mg PO " daily  Stop Intuniv 1 mg PO daily  Start Concerta 18 mg PO daily    Follow up: 6 weeks   Call  Psychiatry at [phone] with issues.  For [facility] residents, [employer] is a 24/7 hotline you can call for assistance [[phone]]. Please call [phone] or go to your closest Emergency Room if you feel unsafe. This includes thoughts of hurting yourself or anyone else, or having other troubles such as hearing voices, seeing visions, or having new and scary thoughts about the people around you.     Review with patient: Treatment plan reviewed with the patient.  Medication risks/benefit reviewed with the patient

## 2025-01-21 ENCOUNTER — HOSPITAL ENCOUNTER (OUTPATIENT)
Dept: RADIOLOGY | Facility: CLINIC | Age: 40
Discharge: HOME | End: 2025-01-21
Payer: COMMERCIAL

## 2025-01-21 DIAGNOSIS — Z12.31 BREAST CANCER SCREENING BY MAMMOGRAM: ICD-10-CM

## 2025-01-21 PROCEDURE — 77067 SCR MAMMO BI INCL CAD: CPT | Performed by: RADIOLOGY

## 2025-01-21 PROCEDURE — 77063 BREAST TOMOSYNTHESIS BI: CPT

## 2025-01-21 PROCEDURE — 77063 BREAST TOMOSYNTHESIS BI: CPT | Performed by: RADIOLOGY

## 2025-02-11 ENCOUNTER — APPOINTMENT (OUTPATIENT)
Dept: BEHAVIORAL HEALTH | Facility: CLINIC | Age: 40
End: 2025-02-11
Payer: COMMERCIAL

## 2025-02-11 DIAGNOSIS — F90.9 ATTENTION DEFICIT HYPERACTIVITY DISORDER (ADHD), UNSPECIFIED ADHD TYPE: ICD-10-CM

## 2025-02-11 PROCEDURE — 99214 OFFICE O/P EST MOD 30 MIN: CPT | Performed by: PSYCHIATRY & NEUROLOGY

## 2025-02-11 RX ORDER — METHYLPHENIDATE HYDROCHLORIDE 36 MG/1
36 TABLET ORAL DAILY
Qty: 30 TABLET | Refills: 0 | Status: SHIPPED | OUTPATIENT
Start: 2025-03-12 | End: 2025-04-11

## 2025-02-11 RX ORDER — BUPROPION HYDROCHLORIDE 150 MG/1
150 TABLET ORAL EVERY MORNING
Qty: 90 TABLET | Refills: 0 | Status: SHIPPED | OUTPATIENT
Start: 2025-02-11 | End: 2025-05-12

## 2025-02-11 RX ORDER — METHYLPHENIDATE HYDROCHLORIDE 36 MG/1
36 TABLET ORAL DAILY
Qty: 30 TABLET | Refills: 0 | Status: SHIPPED | OUTPATIENT
Start: 2025-02-11 | End: 2025-03-13

## 2025-02-11 NOTE — PROGRESS NOTES
"       Virtual or Telephone Consent     An interactive audio and video telecommunication system which permits real time communications between the patient (at the originating site) and provider (at the distant site) was utilized to provide this telehealth service.   Verbal consent was requested and obtained from Elham Scott on this date, 02/11/2025    Last appointment: 01/14/2025 for a telehealth visit.      Subjective  Elham Scott, a 40 y.o. female, presenting to Psychiatry for evaluation.  Patient is referred by Tha Cardoza MD .       Patient just got back from doing errands and did boxing class  Patient started the Concerta and feels it is making a difference  Had ADHD paralysis but was able to get going   Follows ADHD coaches - on days you have low energy and feeling guilty - \"constructive rest\"   More clear headed  \"Less tabs open'    Would like to try the next dose  Sleep and appetite okay       Patient is doing well   Holidays were \"chill\"  January starts getting busy   Does not feel different on Intuniv  Still notices that during the day she has to \"zone out\" in order to get something done  Has been a chronic problem  Feels less \"manic\" about things so maybe noticing it more  Sleep is up and down  Only taking progresterone for half of a month which GYN said could be  8 year old son just started Concerta and is doing better - more organized  Found connection with cycle and her sleep issues   General fatigue before her period  Increased the Wellbutrin XL - has not felt a difference   Things are a little \"easier\" and effort is less to get things done  She feels her \"threshold to initiate tasks\" is lower  She said she still feels very \"scattered\" though  Feels paralyzed at times and feels overwhelmed but this is happening less frequently  Still feels she cannot do things in a \"linear fashion\" - feels scattered often but this has improved a little bit    Patient denies SI, HI, manic or psychotic " symptoms.        Psychiatric Review Of Systems:  Depressive Symptoms: improvement: anhedonia, concentration, and sleep decreased   Manic Symptoms: negative  Anxiety Symptoms: General Anxiety Disorder (JAYCOB)JAYCOB Behaviors: improvement: difficult to control worry, difficulty concentrating, easily fatigued, irritability, restlessness, and sleep disturbance  Psychotic Symptoms: negative  Other Symptoms:  ADHD - inattentive symptoms improved       Current Medications:   - Wellbutrin  mg PO Daily    - Concerta 18 mg Po daily       Medical History:  Medical History           Past Medical History:   Diagnosis Date    Allergy to other foods 10/17/2014     History of food allergy    Chronic rhinitis 10/17/2014     Rhinitis    Encounter for gynecological examination (general) (routine) without abnormal findings 10/22/2017     Well female exam with routine gynecological exam    Encounter for immunization 12/28/2015     Influenza vaccination given    Exercise induced bronchospasm ([facility])       Exercise-induced asthma    Irregular menstruation, unspecified 12/02/2015     Missed period    Other conditions influencing health status       Human Papilloma Virus Infection Type 18    Personal history of diseases of the skin and subcutaneous tissue       History of eczema    Personal history of other diseases of the digestive system 07/01/2014     History of constipation    Personal history of other diseases of the digestive system 10/17/2014     History of esophageal reflux    Personal history of other diseases of the digestive system       History of esophageal reflux    Personal history of other diseases of the female genital tract 12/01/2015     History of amenorrhea    Personal history of other diseases of the respiratory system       History of asthma    Personal history of other diseases of the respiratory system 08/10/2018     History of sore throat    Personal history of other drug therapy       History of vaccination  against human papillomavirus    Personal history of other endocrine, nutritional and metabolic disease 2013     History of vitamin D deficiency    Personal history of other infectious and parasitic diseases       History of varicella    Personal history of other specified conditions 2014     History of abdominal pain    Unspecified injury of unspecified lower leg, initial encounter       Knee injury    Unspecified symptoms and signs involving the genitourinary system 2016     UTI symptoms       - abdominal hernia        Past Psychiatric History:   Diagnoses:  ADHD - [facility]  in [place] ; MDD   Previous Psychiatrist: non  Therapy/past treatments:  when mom passed ;  then again in  six months   Current psychiatric medications:  Wellbutrin XL   Past psychiatric medications:  none  Hospitalizations:  none   Suicide attempts:   none   Family psychiatric history:  father undiagnosed ADHD; 8 year old ASD;  5 year old  ADHD      Social History:   Currently lives:    Education:  [employer] in , [employer]'s [employer]   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[employer] ;  is   - [employer]   Family of origin:  father  2023  Marital history/children:  youngest is five, 8   Current stressors:  ADHD  Social support:   Legal History:  none   History: unknown  History of violence: unknown  Access to Weapons:   Interests:     Substance Use History:   will review at next appointment  Tobacco use: denies  Use of alcohol:     Use of caffeine:     Use of other substances:   Legal consequences of substance use:   Substance use disorder treatment:      Record Review: brief     Medical Review Of Systems:  Pertinent items are noted in HPI.        Objective  Mental Status Exam  Appearance: casually dressed, fair g/h, virtual   Attitude: Calm, cooperative, and engaged in  "conversation.  Behavior: Appropriate eye contact.   Motor Activity: No psychomotor agitation or retardation. No abnormal movements, tremors or tics. No evidence of extrapyramidal symptoms or tardive dyskinesia.  Speech: Regular rate, rhythm, volume. Spontaneous, no pressured speech.  Mood: \"good\" euthymic  Affect: full range, mood congruent.  Thought Process: Linear, logical, and goal-directed. No loose associations or gross thought disorganization.  Thought Content: Denied current suicidal ideation or thoughts of harm to self, denied homicidal ideation or thoughts of harm to others. No delusional thinking elicited. No perseverations or obsessions identified.   Perception: Did not endorse auditory or visual hallucinations, did not appear to be responding to hallucinatory stimuli.   Cognition: Alert, oriented x3. Preserved attention span and concentration, recent and remote memory. Adequate fund of knowledge. No deficits in language.   Insight: Fair, in regards to understanding mental health condition  Judgement: Fair        Vitals:  There were no vitals filed for this visit.     BMI:  31.39     Falls Risk:  n/a     Risk Assessment:  Risk of harm to self: low     Risk of harm to others: low  DXS:   ADHD, inattentive type  JAYCOB  Depression, unspecified     Assessment:  Patient is a 40 year old female diagnosed with ADHD, inattentive type, JAYCOB and depression, unspecified..  Patient feels the Concerta has helped with some focus and attention but would like to increase it further.     Patient is still struggling with inattention, problems focusing and disorganization but improved on Concerta.         Discussed medication risks and benefits      Patient denies SI, HI, manic or psychotic symptoms.  No side effects reported.        Plan/Recommendations:  Medications: continue Wellbutrin XL to 150 mg PO daily  Increase Concerta to 36 mg PO daily    Follow up: 6 weeks   Call  Psychiatry at [phone] with issues.  For " [facility], [facility] Crisis is a 24/7 hotline you can call for assistance [[phone]]. Please call 100/756 or go to your closest Emergency Room if you feel unsafe. This includes thoughts of hurting yourself or anyone else, or having other troubles such as hearing voices, seeing visions, or having new and scary thoughts about the people around you.     Review with patient: Treatment plan reviewed with the patient.  Medication risks/benefit reviewed with the patient

## 2025-03-17 ENCOUNTER — APPOINTMENT (OUTPATIENT)
Dept: BEHAVIORAL HEALTH | Facility: CLINIC | Age: 40
End: 2025-03-17
Payer: COMMERCIAL

## 2025-03-17 DIAGNOSIS — F90.9 ATTENTION DEFICIT HYPERACTIVITY DISORDER (ADHD), UNSPECIFIED ADHD TYPE: ICD-10-CM

## 2025-03-17 PROCEDURE — 99214 OFFICE O/P EST MOD 30 MIN: CPT | Performed by: PSYCHIATRY & NEUROLOGY

## 2025-03-17 RX ORDER — METHYLPHENIDATE HYDROCHLORIDE 36 MG/1
36 TABLET ORAL EVERY MORNING
Qty: 30 TABLET | Refills: 0 | Status: SHIPPED | OUTPATIENT
Start: 2025-05-16 | End: 2025-06-15

## 2025-03-17 RX ORDER — METHYLPHENIDATE HYDROCHLORIDE 36 MG/1
36 TABLET ORAL EVERY MORNING
Qty: 30 TABLET | Refills: 0 | Status: SHIPPED | OUTPATIENT
Start: 2025-04-16 | End: 2025-05-16

## 2025-03-17 RX ORDER — METHYLPHENIDATE HYDROCHLORIDE 36 MG/1
36 TABLET ORAL EVERY MORNING
Qty: 30 TABLET | Refills: 0 | Status: SHIPPED | OUTPATIENT
Start: 2025-03-17 | End: 2025-04-16

## 2025-03-17 NOTE — PROGRESS NOTES
"       Virtual or Telephone Consent     An interactive audio and video telecommunication system which permits real time communications between the patient (at the originating site) and provider (at the distant site) was utilized to provide this telehealth service.   Verbal consent was requested and obtained from Elham Scott on this date, 03/17/2025    Last appointment:  02/11/2025         Subjective  Elham Scott, a 40 y.o. female, presenting to Psychiatry for evaluation.  Patient is referred by Tha Cardoza MD .          fractured his hip five weeks ago - has been mobile though.  He has been back at work for two weeks.  Using a walker.  Did not have to have surgery.  Is healing well.      Feels her medication is working finally.  Feeling more organized and focused.  Has not lost weight but is more active.   Feels more \"clear headed\"  Feels Concerta is at a good dose   Sleep and appetite okay     Found connection with cycle and her sleep issues   General fatigue before her period  Things are a little \"easier\" and effort is less to get things done  She feels her \"threshold to initiate tasks\" is lower  She said she still feels very \"scattered\" though  Feels paralyzed at times and feels overwhelmed but this is happening less frequently  Patient denies SI, HI, manic or psychotic symptoms.     Psychiatric Review Of Systems:  Depressive Symptoms: improvement: anhedonia, concentration, and sleep decreased   Manic Symptoms: negative  Anxiety Symptoms: General Anxiety Disorder (JAYCOB)JAYCOB Behaviors: improvement: difficult to control worry, difficulty concentrating, easily fatigued, irritability, restlessness, and sleep disturbance  Psychotic Symptoms: negative  Other Symptoms:  ADHD - inattentive symptoms improved       Current Medications:   - Wellbutrin  mg PO Daily    - Concerta 36 mg PO daily       Medical History:  Medical History           Past Medical History:   Diagnosis Date    Allergy to " other foods 10/17/2014     History of food allergy    Chronic rhinitis 10/17/2014     Rhinitis    Encounter for gynecological examination (general) (routine) without abnormal findings 10/22/2017     Well female exam with routine gynecological exam    Encounter for immunization 12/28/2015     Influenza vaccination given    [facility]       Exercise-induced asthma    Irregular menstruation, unspecified 12/02/2015     Missed period    Other conditions influencing health status       Human Papilloma Virus Infection Type 18    Personal history of diseases of the skin and subcutaneous tissue       History of eczema    Personal history of other diseases of the digestive system 07/01/2014     History of constipation    Personal history of other diseases of the digestive system 10/17/2014     History of esophageal reflux    Personal history of other diseases of the digestive system       History of esophageal reflux    Personal history of other diseases of the female genital tract 12/01/2015     History of amenorrhea    Personal history of other diseases of the respiratory system       History of asthma    Personal history of other diseases of the respiratory system 08/10/2018     History of sore throat    Personal history of other drug therapy       History of vaccination against human papillomavirus    Personal history of other endocrine, nutritional and metabolic disease 06/19/2013     History of vitamin D deficiency    Personal history of other infectious and parasitic diseases       History of varicella    Personal history of other specified conditions 07/01/2014     History of abdominal pain    Unspecified injury of unspecified lower leg, initial encounter       Knee injury    Unspecified symptoms and signs involving the genitourinary system 09/08/2016     UTI symptoms       - abdominal hernia        Past Psychiatric History:   Diagnoses:  ADHD - [facility]  in [place]  "; MDD   Previous Psychiatrist: non  Therapy/past treatments:  when mom passed ;  then again in  six months   Current psychiatric medications:  Wellbutrin XL   Past psychiatric medications:  none  Hospitalizations:  none   Suicide attempts:   none   Family psychiatric history:  father undiagnosed ADHD; 8 year old ASD;  5 year old  ADHD      Social History:   Currently lives:    Education:  [place] in , [employer]'s [employer]   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[employer] ;  is   - [employer]   Family of origin:  father  2023  Marital history/children:  youngest is five, 8   Current stressors:  ADHD  Social support:   Legal History:  none   History: unknown  History of violence: unknown  Access to Weapons:   Interests:     Substance Use History:   will review at next appointment  Tobacco use: denies  Use of alcohol:   social   Use of caffeine:   unknown  Use of other substances:  denies  Legal consequences of substance use: denies   Substance use disorder treatment: denies     Record Review: brief     Medical Review Of Systems:  Pertinent items are noted in HPI.        Objective  Mental Status Exam  Appearance: casually dressed, fair g/h, virtual   Attitude: Calm, cooperative, and engaged in conversation.  Behavior: Appropriate eye contact.   Motor Activity: No psychomotor agitation or retardation. No abnormal movements, tremors or tics. No evidence of extrapyramidal symptoms or tardive dyskinesia.  Speech: Regular rate, rhythm, volume. Spontaneous, no pressured speech.  Mood: \"good\" euthymic  Affect: full range, mood congruent.  Thought Process: Linear, logical, and goal-directed. No loose associations or gross thought disorganization.  Thought Content: Denied current suicidal ideation or thoughts of harm to self, denied homicidal ideation or thoughts of harm to others. No delusional " thinking elicited. No perseverations or obsessions identified.   Perception: Did not endorse auditory or visual hallucinations, did not appear to be responding to hallucinatory stimuli.   Cognition: Alert, oriented x3. Preserved attention span and concentration, recent and remote memory. Adequate fund of knowledge. No deficits in language.   Insight: Fair, in regards to understanding mental health condition  Judgement: Fair        Vitals:  There were no vitals filed for this visit.     BMI:  31.39     Falls Risk:  n/a     Risk Assessment:  Risk of harm to self: low     Risk of harm to others: low  DXS:   ADHD, inattentive type  JAYCOB  Depression, unspecified     Assessment:  Patient is a 40 year old female diagnosed with ADHD, inattentive type, JAYCOB and depression, unspecified..  Patient feels the Concerta has helped with some focus and attention but would like to increase it further.      Patient is still struggling with inattention, problems focusing and disorganization but improved on Concerta.         Discussed medication risks and benefits      Patient denies SI, HI, manic or psychotic symptoms.  No side effects reported.        Plan/Recommendations:  Medications: continue Wellbutrin XL to 150 mg PO daily  Increase Concerta to 36 mg PO daily    Follow up: 6 weeks   Call  Psychiatry at [phone] with issues.  For [facility] residents, [employer] is a 24/7 hotline you can call for assistance [[phone]]. Please call [phone] or go to your closest Emergency Room if you feel unsafe. This includes thoughts of hurting yourself or anyone else, or having other troubles such as hearing voices, seeing visions, or having new and scary thoughts about the people around you.     Review with patient: Treatment plan reviewed with the patient.  Medication risks/benefit reviewed with the patient

## 2025-03-28 DIAGNOSIS — N95.1 MENOPAUSAL SYNDROME ON HORMONE REPLACEMENT THERAPY: ICD-10-CM

## 2025-03-28 DIAGNOSIS — Z79.890 MENOPAUSAL SYNDROME ON HORMONE REPLACEMENT THERAPY: ICD-10-CM

## 2025-03-29 RX ORDER — PROGESTERONE 200 MG/1
CAPSULE ORAL
Qty: 14 CAPSULE | Refills: 11 | Status: SHIPPED | OUTPATIENT
Start: 2025-03-29

## 2025-03-29 RX ORDER — ESTRADIOL 1 MG/1
1 TABLET ORAL DAILY
Qty: 30 TABLET | Refills: 11 | Status: SHIPPED | OUTPATIENT
Start: 2025-03-29

## 2025-04-09 DIAGNOSIS — N95.1 MENOPAUSAL SYNDROME ON HORMONE REPLACEMENT THERAPY: ICD-10-CM

## 2025-04-09 DIAGNOSIS — Z79.890 MENOPAUSAL SYNDROME ON HORMONE REPLACEMENT THERAPY: ICD-10-CM

## 2025-04-09 RX ORDER — ESTRADIOL 1 MG/1
1 TABLET ORAL DAILY
Qty: 30 TABLET | Refills: 0 | OUTPATIENT
Start: 2025-04-09

## 2025-04-24 LAB
ALBUMIN SERPL-MCNC: 4.1 G/DL (ref 3.6–5.1)
ALP SERPL-CCNC: 47 U/L (ref 31–125)
ALT SERPL-CCNC: 12 U/L (ref 6–29)
AMPHETAMINES UR QL: NEGATIVE NG/ML
ANION GAP SERPL CALCULATED.4IONS-SCNC: 6 MMOL/L (CALC) (ref 7–17)
AST SERPL-CCNC: 13 U/L (ref 10–30)
BARBITURATES UR QL: NEGATIVE NG/ML
BASOPHILS # BLD AUTO: 30 CELLS/UL (ref 0–200)
BASOPHILS NFR BLD AUTO: 1 %
BENZODIAZ UR QL: NEGATIVE NG/ML
BILIRUB SERPL-MCNC: 0.5 MG/DL (ref 0.2–1.2)
BUN SERPL-MCNC: 14 MG/DL (ref 7–25)
BZE UR QL: NEGATIVE NG/ML
CALCIUM SERPL-MCNC: 8.5 MG/DL (ref 8.6–10.2)
CHLORIDE SERPL-SCNC: 105 MMOL/L (ref 98–110)
CHOLEST SERPL-MCNC: 184 MG/DL
CHOLEST/HDLC SERPL: 3.3 (CALC)
CO2 SERPL-SCNC: 29 MMOL/L (ref 20–32)
CREAT SERPL-MCNC: 0.82 MG/DL (ref 0.5–0.99)
CREAT UR-MCNC: 46.2 MG/DL
EGFRCR SERPLBLD CKD-EPI 2021: 92 ML/MIN/1.73M2
EOSINOPHIL # BLD AUTO: 72 CELLS/UL (ref 15–500)
EOSINOPHIL NFR BLD AUTO: 2.4 %
ERYTHROCYTE [DISTWIDTH] IN BLOOD BY AUTOMATED COUNT: 12.3 % (ref 11–15)
GLUCOSE SERPL-MCNC: 76 MG/DL (ref 65–99)
HCT VFR BLD AUTO: 40.9 % (ref 35–45)
HDLC SERPL-MCNC: 55 MG/DL
HGB BLD-MCNC: 13 G/DL (ref 11.7–15.5)
LDLC SERPL CALC-MCNC: 112 MG/DL (CALC)
LYMPHOCYTES # BLD AUTO: 936 CELLS/UL (ref 850–3900)
LYMPHOCYTES NFR BLD AUTO: 31.2 %
MCH RBC QN AUTO: 30.4 PG (ref 27–33)
MCHC RBC AUTO-ENTMCNC: 31.8 G/DL (ref 32–36)
MCV RBC AUTO: 95.8 FL (ref 80–100)
METHADONE UR QL: NEGATIVE NG/ML
MONOCYTES # BLD AUTO: 174 CELLS/UL (ref 200–950)
MONOCYTES NFR BLD AUTO: 5.8 %
NEUTROPHILS # BLD AUTO: 1788 CELLS/UL (ref 1500–7800)
NEUTROPHILS NFR BLD AUTO: 59.6 %
NONHDLC SERPL-MCNC: 129 MG/DL (CALC)
OPIATES UR QL: NEGATIVE NG/ML
OXIDANTS UR QL: NEGATIVE MCG/ML
OXYCODONE UR QL: NEGATIVE NG/ML
PCP UR QL: NEGATIVE NG/ML
PH UR: 7.7 [PH] (ref 4.5–9)
PLATELET # BLD AUTO: 226 THOUSAND/UL (ref 140–400)
PMV BLD REES-ECKER: 10.2 FL (ref 7.5–12.5)
POTASSIUM SERPL-SCNC: 4.1 MMOL/L (ref 3.5–5.3)
PROT SERPL-MCNC: 6.5 G/DL (ref 6.1–8.1)
QUEST NOTES AND COMMENTS: NORMAL
RBC # BLD AUTO: 4.27 MILLION/UL (ref 3.8–5.1)
SODIUM SERPL-SCNC: 140 MMOL/L (ref 135–146)
THC UR QL: NEGATIVE NG/ML
TRIGL SERPL-MCNC: 76 MG/DL
WBC # BLD AUTO: 3 THOUSAND/UL (ref 3.8–10.8)

## 2025-04-25 DIAGNOSIS — R79.89 ABNORMAL CBC: Primary | ICD-10-CM

## 2025-05-13 DIAGNOSIS — Z79.890 MENOPAUSAL SYNDROME ON HORMONE REPLACEMENT THERAPY: ICD-10-CM

## 2025-05-13 DIAGNOSIS — N95.1 MENOPAUSAL SYNDROME ON HORMONE REPLACEMENT THERAPY: ICD-10-CM

## 2025-05-13 RX ORDER — PROGESTERONE 200 MG/1
200 CAPSULE ORAL DAILY
Qty: 30 CAPSULE | Refills: 11 | Status: SHIPPED | OUTPATIENT
Start: 2025-05-13

## 2025-05-25 DIAGNOSIS — R79.89 ABNORMAL CBC: ICD-10-CM

## 2025-06-03 LAB
ALBUMIN SERPL-MCNC: 4 G/DL (ref 3.6–5.1)
ALBUMIN/GLOB SERPL: 1.8 (CALC) (ref 1–2.5)
ALP SERPL-CCNC: 46 U/L (ref 31–125)
ALT SERPL-CCNC: 12 U/L (ref 6–29)
AST SERPL-CCNC: 13 U/L (ref 10–30)
BASOPHILS # BLD AUTO: 28 CELLS/UL (ref 0–200)
BASOPHILS NFR BLD AUTO: 0.7 %
BILIRUB SERPL-MCNC: 0.5 MG/DL (ref 0.2–1.2)
BUN SERPL-MCNC: 14 MG/DL (ref 7–25)
BUN/CREAT SERPL: ABNORMAL (CALC) (ref 6–22)
CALCIUM SERPL-MCNC: 8.2 MG/DL (ref 8.6–10.2)
CHLORIDE SERPL-SCNC: 106 MMOL/L (ref 98–110)
CHOLEST SERPL-MCNC: 173 MG/DL
CHOLEST/HDLC SERPL: 3.3 (CALC)
CO2 SERPL-SCNC: 28 MMOL/L (ref 20–32)
CREAT SERPL-MCNC: 0.9 MG/DL (ref 0.5–0.99)
EGFRCR SERPLBLD CKD-EPI 2021: 82 ML/MIN/1.73M2
EOSINOPHIL # BLD AUTO: 108 CELLS/UL (ref 15–500)
EOSINOPHIL NFR BLD AUTO: 2.7 %
ERYTHROCYTE [DISTWIDTH] IN BLOOD BY AUTOMATED COUNT: 12.8 % (ref 11–15)
GLOBULIN SER CALC-MCNC: 2.2 G/DL (CALC) (ref 1.9–3.7)
GLUCOSE SERPL-MCNC: 85 MG/DL (ref 65–99)
HCT VFR BLD AUTO: 39.3 % (ref 35–45)
HDLC SERPL-MCNC: 52 MG/DL
HGB BLD-MCNC: 12.3 G/DL (ref 11.7–15.5)
LDLC SERPL CALC-MCNC: 105 MG/DL (CALC)
LYMPHOCYTES # BLD AUTO: 1040 CELLS/UL (ref 850–3900)
LYMPHOCYTES NFR BLD AUTO: 26 %
MCH RBC QN AUTO: 30.2 PG (ref 27–33)
MCHC RBC AUTO-ENTMCNC: 31.3 G/DL (ref 32–36)
MCV RBC AUTO: 96.6 FL (ref 80–100)
MONOCYTES # BLD AUTO: 308 CELLS/UL (ref 200–950)
MONOCYTES NFR BLD AUTO: 7.7 %
NEUTROPHILS # BLD AUTO: 2516 CELLS/UL (ref 1500–7800)
NEUTROPHILS NFR BLD AUTO: 62.9 %
NONHDLC SERPL-MCNC: 121 MG/DL (CALC)
PLATELET # BLD AUTO: 216 THOUSAND/UL (ref 140–400)
PMV BLD REES-ECKER: 10.6 FL (ref 7.5–12.5)
POTASSIUM SERPL-SCNC: 4.3 MMOL/L (ref 3.5–5.3)
PROT SERPL-MCNC: 6.2 G/DL (ref 6.1–8.1)
RBC # BLD AUTO: 4.07 MILLION/UL (ref 3.8–5.1)
SODIUM SERPL-SCNC: 139 MMOL/L (ref 135–146)
TRIGL SERPL-MCNC: 69 MG/DL
WBC # BLD AUTO: 4 THOUSAND/UL (ref 3.8–10.8)

## 2025-06-09 ENCOUNTER — APPOINTMENT (OUTPATIENT)
Dept: BEHAVIORAL HEALTH | Facility: CLINIC | Age: 40
End: 2025-06-09
Payer: COMMERCIAL

## 2025-06-09 DIAGNOSIS — F90.9 ATTENTION DEFICIT HYPERACTIVITY DISORDER (ADHD), UNSPECIFIED ADHD TYPE: ICD-10-CM

## 2025-06-09 PROCEDURE — 99214 OFFICE O/P EST MOD 30 MIN: CPT | Performed by: PSYCHIATRY & NEUROLOGY

## 2025-06-09 RX ORDER — METHYLPHENIDATE HYDROCHLORIDE 36 MG/1
36 TABLET ORAL EVERY MORNING
Qty: 30 TABLET | Refills: 0 | Status: SHIPPED | OUTPATIENT
Start: 2025-08-15 | End: 2025-09-14

## 2025-06-09 RX ORDER — METHYLPHENIDATE HYDROCHLORIDE 36 MG/1
36 TABLET ORAL EVERY MORNING
Qty: 30 TABLET | Refills: 0 | Status: SHIPPED | OUTPATIENT
Start: 2025-09-14 | End: 2025-10-14

## 2025-06-09 RX ORDER — METHYLPHENIDATE HYDROCHLORIDE 36 MG/1
36 TABLET ORAL EVERY MORNING
Qty: 30 TABLET | Refills: 0 | Status: SHIPPED | OUTPATIENT
Start: 2025-07-16 | End: 2025-08-15

## 2025-06-09 RX ORDER — BUPROPION HYDROCHLORIDE 75 MG/1
75 TABLET ORAL DAILY
Qty: 30 TABLET | Refills: 0 | Status: SHIPPED | OUTPATIENT
Start: 2025-06-09 | End: 2025-07-09

## 2025-06-09 NOTE — PROGRESS NOTES
"  Virtual or Telephone Consent     An interactive audio and video telecommunication system which permits real time communications between the patient (at the originating site) and provider (at the distant site) was utilized to provide this telehealth service.   Verbal consent was requested and obtained from Elham Scott on this date, 06/09/2025       Subjective  Elham Scott, a 40 y.o. female, presenting to Psychiatry for evaluation.  Patient is referred by Tha Cardoza MD .       Patient said she is doing well  Working part time for first summer ever  Now on Concerta 36 and feels this dose is much better   Would like to go off of the Wellbutrin XL - she is going to stop it   Feels her medication is working \"finally\".    Feeling more organized and focused.    Has not lost weight but is more active.   Feels more \"clear headed\"  Feels Concerta is at a good dose   Sleep and appetite okay     Found connection with cycle and her sleep issues   General fatigue before her period  Things are a little \"easier\" and effort is less to get things done  She feels her \"threshold to initiate tasks\" is lower  Feels paralyzed at times and feels overwhelmed but this is happening less frequently  Patient denies SI, HI, manic or psychotic symptoms.     Psychiatric Review Of Systems:  Depressive Symptoms: improvement: anhedonia, concentration, and sleep decreased   Manic Symptoms: negative  Anxiety Symptoms: General Anxiety Disorder (JAYCOB)JAYCOB Behaviors: improvement: difficult to control worry, difficulty concentrating, easily fatigued, irritability, restlessness, and sleep disturbance  Psychotic Symptoms: negative  Other Symptoms:  ADHD - inattentive symptoms improved       Current Medications:   - Wellbutrin  mg PO Daily    - Concerta 36 mg PO daily       Medical History:  Medical History           Past Medical History:   Diagnosis Date    Allergy to other foods 10/17/2014     History of food allergy    Chronic " rhinitis 10/17/2014     Rhinitis    Encounter for gynecological examination (general) (routine) without abnormal findings 10/22/2017     Well female exam with routine gynecological exam    Encounter for immunization 12/28/2015     Influenza vaccination given    [facility]       Exercise-induced asthma    Irregular menstruation, unspecified 12/02/2015     Missed period    Other conditions influencing health status       Human Papilloma Virus Infection Type 18    Personal history of diseases of the skin and subcutaneous tissue       History of eczema    Personal history of other diseases of the digestive system 07/01/2014     History of constipation    Personal history of other diseases of the digestive system 10/17/2014     History of esophageal reflux    Personal history of other diseases of the digestive system       History of esophageal reflux    Personal history of other diseases of the female genital tract 12/01/2015     History of amenorrhea    Personal history of other diseases of the respiratory system       History of asthma    Personal history of other diseases of the respiratory system 08/10/2018     History of sore throat    Personal history of other drug therapy       History of vaccination against human papillomavirus    Personal history of other endocrine, nutritional and metabolic disease 06/19/2013     History of vitamin D deficiency    Personal history of other infectious and parasitic diseases       History of varicella    Personal history of other specified conditions 07/01/2014     History of abdominal pain    Unspecified injury of unspecified lower leg, initial encounter       Knee injury    Unspecified symptoms and signs involving the genitourinary system 09/08/2016     UTI symptoms       - abdominal hernia        Past Psychiatric History:   Diagnoses:  ADHD - [facility]  in [place] 2022; MDD 2022  Previous Psychiatrist: non  Therapy/past  "treatments:  when mom passed ;  then again in  six months   Current psychiatric medications:  Wellbutrin XL   Past psychiatric medications:  none  Hospitalizations:  none   Suicide attempts:   none   Family psychiatric history:  father undiagnosed ADHD; 8 year old ASD;  5 year old  ADHD      Social History:   Currently lives:    Education:  [employer] [place] in [place], Grad [employer]   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[employer] ;  is   - [employer]   Family of origin:  father  2023  Marital history/children:  youngest is five, 8   Current stressors:  ADHD  Social support:   Legal History:  none   History: unknown  History of violence: unknown  Access to Weapons:   Interests:     Substance Use History:   will review at next appointment  Tobacco use: denies  Use of alcohol:   social   Use of caffeine:   unknown  Use of other substances:  denies  Legal consequences of substance use: denies   Substance use disorder treatment: denies     Record Review: brief     Medical Review Of Systems:  Pertinent items are noted in HPI.        Objective  Mental Status Exam  Appearance: casually dressed, fair g/h, virtual   Attitude: Calm, cooperative, and engaged in conversation.  Behavior: Appropriate eye contact.   Motor Activity: No psychomotor agitation or retardation. No abnormal movements, tremors or tics. No evidence of extrapyramidal symptoms or tardive dyskinesia.  Speech: Regular rate, rhythm, volume. Spontaneous, no pressured speech.  Mood: \"good\" euthymic  Affect: full range, mood congruent.  Thought Process: Linear, logical, and goal-directed. No loose associations or gross thought disorganization.  Thought Content: Denied current suicidal ideation or thoughts of harm to self, denied homicidal ideation or thoughts of harm to others. No delusional thinking elicited. No perseverations or obsessions identified. "   Perception: Did not endorse auditory or visual hallucinations, did not appear to be responding to hallucinatory stimuli.   Cognition: Alert, oriented x3. Preserved attention span and concentration, recent and remote memory. Adequate fund of knowledge. No deficits in language.   Insight: Fair, in regards to understanding mental health condition  Judgement: Fair    OAARS:  Reviewed   CSA: 1/14/2025  Drug Screen:  5/25/2025      Orders Only on 05/25/2025   Component Date Value Ref Range Status    WHITE BLOOD CELL COUNT 06/02/2025 4.0  3.8 - 10.8 Thousand/uL Final    RED BLOOD CELL COUNT 06/02/2025 4.07  3.80 - 5.10 Million/uL Final    HEMOGLOBIN 06/02/2025 12.3  11.7 - 15.5 g/dL Final    HEMATOCRIT 06/02/2025 39.3  35.0 - 45.0 % Final    MCV 06/02/2025 96.6  80.0 - 100.0 fL Final    MCH 06/02/2025 30.2  27.0 - 33.0 pg Final    MCHC 06/02/2025 31.3 (L)  32.0 - 36.0 g/dL Final    Comment: For adults, a slight decrease in the calculated MCHC  value (in the range of 30 to 32 g/dL) is most likely  not clinically significant; however, it should be  interpreted with caution in correlation with other  red cell parameters and the patient's clinical  condition.      RDW 06/02/2025 12.8  11.0 - 15.0 % Final    PLATELET COUNT 06/02/2025 216  140 - 400 Thousand/uL Final    MPV 06/02/2025 10.6  7.5 - 12.5 fL Final    ABSOLUTE NEUTROPHILS 06/02/2025 2,516  1,500 - 7,800 cells/uL Final    ABSOLUTE LYMPHOCYTES 06/02/2025 1,040  850 - 3,900 cells/uL Final    ABSOLUTE MONOCYTES 06/02/2025 308  200 - 950 cells/uL Final    ABSOLUTE EOSINOPHILS 06/02/2025 108  15 - 500 cells/uL Final    ABSOLUTE BASOPHILS 06/02/2025 28  0 - 200 cells/uL Final    NEUTROPHILS 06/02/2025 62.9  % Final    LYMPHOCYTES 06/02/2025 26.0  % Final    MONOCYTES 06/02/2025 7.7  % Final    EOSINOPHILS 06/02/2025 2.7  % Final    BASOPHILS 06/02/2025 0.7  % Final   Orders Only on 04/25/2025   Component Date Value Ref Range Status    CHOLESTEROL, TOTAL 06/02/2025 173   <200 mg/dL Final    HDL CHOLESTEROL 06/02/2025 52  > OR = 50 mg/dL Final    TRIGLYCERIDES 06/02/2025 69  <150 mg/dL Final    LDL-CHOLESTEROL 06/02/2025 105 (H)  mg/dL (calc) Final    Comment: Reference range: <100     Desirable range <100 mg/dL for primary prevention;    <70 mg/dL for patients with CHD or diabetic patients   with > or = 2 CHD risk factors.     LDL-C is now calculated using the Stan-Lang   calculation, which is a validated novel method providing   better accuracy than the Friedewald equation in the   estimation of LDL-C.   Stan AGUILA et al. LESLIE. 2013;310(19): 2122-7190   (http://education.[employer].ActionFlow/faq/ZQT882)      CHOL/HDLC RATIO 06/02/2025 3.3  <5.0 (calc) Final    NON HDL CHOLESTEROL 06/02/2025 121  <130 mg/dL (calc) Final    Comment: For patients with diabetes plus 1 major ASCVD risk   factor, treating to a non-HDL-C goal of <100 mg/dL   (LDL-C of <70 mg/dL) is considered a therapeutic   option.      GLUCOSE 06/02/2025 85  65 - 99 mg/dL Final    Comment:               Fasting reference interval         UREA NITROGEN (BUN) 06/02/2025 14  7 - 25 mg/dL Final    CREATININE 06/02/2025 0.90  0.50 - 0.99 mg/dL Final    EGFR 06/02/2025 82  > OR = 60 mL/min/1.73m2 Final    BUN/CREATININE RATIO 06/02/2025 SEE NOTE:  6 - 22 (calc) Final    Comment:    Not Reported: BUN and Creatinine are within     reference range.            SODIUM 06/02/2025 139  135 - 146 mmol/L Final    POTASSIUM 06/02/2025 4.3  3.5 - 5.3 mmol/L Final    CHLORIDE 06/02/2025 106  98 - 110 mmol/L Final    CARBON DIOXIDE 06/02/2025 28  20 - 32 mmol/L Final    CALCIUM 06/02/2025 8.2 (L)  8.6 - 10.2 mg/dL Final    PROTEIN, TOTAL 06/02/2025 6.2  6.1 - 8.1 g/dL Final    ALBUMIN 06/02/2025 4.0  3.6 - 5.1 g/dL Final    GLOBULIN 06/02/2025 2.2  1.9 - 3.7 g/dL (calc) Final    ALBUMIN/GLOBULIN RATIO 06/02/2025 1.8  1.0 - 2.5 (calc) Final    BILIRUBIN, TOTAL 06/02/2025 0.5  0.2 - 1.2 mg/dL Final    ALKALINE PHOSPHATASE 06/02/2025 46   31 - 125 U/L Final    AST 06/02/2025 13  10 - 30 U/L Final    ALT 06/02/2025 12  6 - 29 U/L Final   Orders Only on 04/23/2025   Component Date Value Ref Range Status    CHOLESTEROL, TOTAL 04/23/2025 184  <200 mg/dL Final    HDL CHOLESTEROL 04/23/2025 55  > OR = 50 mg/dL Final    TRIGLYCERIDES 04/23/2025 76  <150 mg/dL Final    LDL-CHOLESTEROL 04/23/2025 112 (H)  mg/dL (calc) Final    Comment: Reference range: <100     Desirable range <100 mg/dL for primary prevention;    <70 mg/dL for patients with CHD or diabetic patients   with > or = 2 CHD risk factors.     LDL-C is now calculated using the Dina   calculation, which is a validated novel method providing   better accuracy than the Friedewald equation in the   estimation of LDL-C.   Stan AGUILA et al. LESLIE. 2013;310(19): 3099-1557   (http://education.[employer]zing [employer] Letters.com/faq/EVZ376)      CHOL/HDLC RATIO 04/23/2025 3.3  <5.0 (calc) Final    NON HDL CHOLESTEROL 04/23/2025 129  <130 mg/dL (calc) Final    Comment: For patients with diabetes plus 1 major ASCVD risk   factor, treating to a non-HDL-C goal of <100 mg/dL   (LDL-C of <70 mg/dL) is considered a therapeutic   option.      GLUCOSE 04/23/2025 76  65 - 99 mg/dL Final    Comment:               Fasting reference interval         UREA NITROGEN (BUN) 04/23/2025 14  7 - 25 mg/dL Final    CREATININE 04/23/2025 0.82  0.50 - 0.99 mg/dL Final    EGFR 04/23/2025 92  > OR = 60 mL/min/1.73m2 Final    SODIUM 04/23/2025 140  135 - 146 mmol/L Final    POTASSIUM 04/23/2025 4.1  3.5 - 5.3 mmol/L Final    CHLORIDE 04/23/2025 105  98 - 110 mmol/L Final    CARBON DIOXIDE 04/23/2025 29  20 - 32 mmol/L Final    ELECTROLYTE BALANCE 04/23/2025 6 (L)  7 - 17 mmol/L (calc) Final    CALCIUM 04/23/2025 8.5 (L)  8.6 - 10.2 mg/dL Final    PROTEIN, TOTAL 04/23/2025 6.5  6.1 - 8.1 g/dL Final    ALBUMIN 04/23/2025 4.1  3.6 - 5.1 g/dL Final    BILIRUBIN, TOTAL 04/23/2025 0.5  0.2 - 1.2 mg/dL Final    ALKALINE PHOSPHATASE 04/23/2025 47  31  - 125 U/L Final    AST 04/23/2025 13  10 - 30 U/L Final    ALT 04/23/2025 12  6 - 29 U/L Final    WHITE BLOOD CELL COUNT 04/23/2025 3.0 (L)  3.8 - 10.8 Thousand/uL Final    RED BLOOD CELL COUNT 04/23/2025 4.27  3.80 - 5.10 Million/uL Final    HEMOGLOBIN 04/23/2025 13.0  11.7 - 15.5 g/dL Final    HEMATOCRIT 04/23/2025 40.9  35.0 - 45.0 % Final    MCV 04/23/2025 95.8  80.0 - 100.0 fL Final    MCH 04/23/2025 30.4  27.0 - 33.0 pg Final    MCHC 04/23/2025 31.8 (L)  32.0 - 36.0 g/dL Final    Comment: For adults, a slight decrease in the calculated MCHC  value (in the range of 30 to 32 g/dL) is most likely  not clinically significant; however, it should be  interpreted with caution in correlation with other  red cell parameters and the patient's clinical  condition.      RDW 04/23/2025 12.3  11.0 - 15.0 % Final    PLATELET COUNT 04/23/2025 226  140 - 400 Thousand/uL Final    MPV 04/23/2025 10.2  7.5 - 12.5 fL Final    ABSOLUTE NEUTROPHILS 04/23/2025 1,788  1,500 - 7,800 cells/uL Final    ABSOLUTE LYMPHOCYTES 04/23/2025 936  850 - 3,900 cells/uL Final    ABSOLUTE MONOCYTES 04/23/2025 174 (L)  200 - 950 cells/uL Final    ABSOLUTE EOSINOPHILS 04/23/2025 72  15 - 500 cells/uL Final    ABSOLUTE BASOPHILS 04/23/2025 30  0 - 200 cells/uL Final    NEUTROPHILS 04/23/2025 59.6  % Final    LYMPHOCYTES 04/23/2025 31.2  % Final    MONOCYTES 04/23/2025 5.8  % Final    EOSINOPHILS 04/23/2025 2.4  % Final    BASOPHILS 04/23/2025 1.0  % Final   Orders Only on 04/23/2025   Component Date Value Ref Range Status    Amphetamines 04/23/2025 NEGATIVE  <500 ng/mL Final    Barbiturates 04/23/2025 NEGATIVE  <300 ng/mL Final    Benzodiazepines 04/23/2025 NEGATIVE  <100 ng/mL Final    Cocaine Metabolite 04/23/2025 NEGATIVE  <150 ng/mL Final    Marijuana Metabolite 04/23/2025 NEGATIVE  <20 ng/mL Final    Methadone Metabolite 04/23/2025 NEGATIVE  <100 ng/mL Final    Opiates 04/23/2025 NEGATIVE  <100 ng/mL Final    Oxycodone 04/23/2025 NEGATIVE  <100  ng/mL Final    Phencyclidine 04/23/2025 NEGATIVE  <25 ng/mL Final    Creatinine 04/23/2025 46.2  > or = 20.0 mg/dL Final    pH 04/23/2025 7.7  4.5 - 9.0 Final    Oxidant 04/23/2025 NEGATIVE  <200 mcg/mL Final    Notes and Comments 04/23/2025    Final    Comment: This drug testing is for medical treatment only.  Analysis was performed as non-forensic testing and  these results should be used only by healthcare  providers to render diagnosis or treatment, or to  monitor progress of medical conditions.        Healthcare Providers needing Interpretation assistance,   please contact us at [phone] ([phone])   M-F, 8am to 10pm EST            Vitals:  There were no vitals filed for this visit.     BMI:  31.39     Falls Risk:  n/a     Risk Assessment:  Risk of harm to self: low     Risk of harm to others: low  DXS:   ADHD, inattentive type  JAYCOB  Depression, unspecified     Assessment:  Patient is a 40 year old female diagnosed with ADHD, inattentive type, JAYCOB and depression, unspecified..  Patient feels the increased Concerta has helped with focus and attention.  She feels that this is a good dose.  She no longer feels she needs the Wellbutrin and is going to discontinue it.        Patient is still struggling with inattention, problems focusing and disorganization but improved on Concerta.         Discussed medication risks and benefits      Patient denies SI, HI, manic or psychotic symptoms.  No side effects reported.        Plan/Recommendations:  Medications: continue Concerta 36 mg PO daily   Discontinue Wellbutrin XL    Follow up: 3 months   Call  Psychiatry at [phone] with issues.  For [facility] residents, [employer] is a 24/7 hotline you can call for assistance [[phone]]. Please call [phone] or go to your closest Emergency Room if you feel unsafe. This includes thoughts of hurting yourself or anyone else, or having other troubles such as hearing voices, seeing visions, or having new  and scary thoughts about the people around you.     Review with patient: Treatment plan reviewed with the patient.  Medication risks/benefit reviewed with the patient

## 2025-06-23 DIAGNOSIS — F90.9 ATTENTION DEFICIT HYPERACTIVITY DISORDER (ADHD), UNSPECIFIED ADHD TYPE: ICD-10-CM

## 2025-06-23 RX ORDER — METHYLPHENIDATE HYDROCHLORIDE 36 MG/1
36 TABLET ORAL DAILY
Qty: 30 TABLET | Refills: 0 | Status: SHIPPED | OUTPATIENT
Start: 2025-06-23 | End: 2025-07-23

## 2025-09-08 ENCOUNTER — APPOINTMENT (OUTPATIENT)
Dept: BEHAVIORAL HEALTH | Facility: CLINIC | Age: 40
End: 2025-09-08
Payer: COMMERCIAL

## 2026-01-12 ENCOUNTER — APPOINTMENT (OUTPATIENT)
Dept: PRIMARY CARE | Facility: CLINIC | Age: 41
End: 2026-01-12
Payer: COMMERCIAL